# Patient Record
Sex: MALE | Race: WHITE | NOT HISPANIC OR LATINO | Employment: FULL TIME | URBAN - METROPOLITAN AREA
[De-identification: names, ages, dates, MRNs, and addresses within clinical notes are randomized per-mention and may not be internally consistent; named-entity substitution may affect disease eponyms.]

---

## 2017-02-17 ENCOUNTER — OFFICE VISIT (OUTPATIENT)
Dept: SPORTS MEDICINE | Facility: CLINIC | Age: 32
End: 2017-02-17

## 2017-02-17 VITALS
BODY MASS INDEX: 39.48 KG/M2 | DIASTOLIC BLOOD PRESSURE: 92 MMHG | WEIGHT: 282 LBS | HEART RATE: 78 BPM | SYSTOLIC BLOOD PRESSURE: 140 MMHG | RESPIRATION RATE: 16 BRPM | HEIGHT: 71 IN | OXYGEN SATURATION: 98 %

## 2017-02-17 DIAGNOSIS — R03.0 ELEVATED BLOOD PRESSURE (NOT HYPERTENSION): Primary | ICD-10-CM

## 2017-02-17 DIAGNOSIS — E78.2 MIXED HYPERLIPIDEMIA: ICD-10-CM

## 2017-02-17 DIAGNOSIS — J30.1 NON-SEASONAL ALLERGIC RHINITIS DUE TO POLLEN: ICD-10-CM

## 2017-02-17 LAB
CHOLEST SERPL-MCNC: 206 MG/DL (ref 0–200)
HDLC SERPL-MCNC: 35 MG/DL (ref 40–60)
LDLC SERPL CALC-MCNC: 127 MG/DL (ref 0–100)
TRIGL SERPL-MCNC: 218 MG/DL (ref 0–150)
VLDLC SERPL CALC-MCNC: 43.6 MG/DL (ref 5–40)

## 2017-02-17 PROCEDURE — 99214 OFFICE O/P EST MOD 30 MIN: CPT | Performed by: FAMILY MEDICINE

## 2017-02-17 RX ORDER — IPRATROPIUM BROMIDE 21 UG/1
2 SPRAY, METERED NASAL 2 TIMES DAILY PRN
Qty: 30 ML | Refills: 5 | Status: SHIPPED | OUTPATIENT
Start: 2017-02-17 | End: 2017-05-19

## 2017-02-17 NOTE — PROGRESS NOTES
Obinna is a 31 y.o. year old male    Chief Complaint   Patient presents with   • Follow-up   • Hyperlipidemia     follow up cholesterol levels   • Hypertension     states he feels likehis BP has been elevated past couple weeks       History of Present Illness  Elevated BP: He is noticed some symptoms over the past few weeks that he may attribute to his elevated blood pressure.  Namely, he has had a headache in the back of his head as well as some shortness of breath when he is hiking.  Family history of hypertension as well.  His weight has not fluctuated much over the past year.  States that it fluctuates between 265 and 280.  Previous recording at my office was inaccurate.  He also associates some fatigue.    HLD: Persists.  Again, weight has not changed much.  His father passed away unexpectedly in December and he has not been as physically active since then.  Is fasting and would like updated lipid panel today.    AR: Persists.  Did not notice much difference with Flonase at last appointment.  Only thing that seemed to help has been Zyrtec.  Is willing to consider another no spray.    I have reviewed the patient's medical history in detail and updated the computerized patient record.    Review of Systems   Constitutional: Positive for fatigue. Negative for chills and fever.   HENT: Positive for congestion and postnasal drip. Negative for rhinorrhea and sinus pressure.    Respiratory: Negative for chest tightness and shortness of breath.    Cardiovascular: Negative for chest pain.   Gastrointestinal: Negative for abdominal pain.   Musculoskeletal: Negative for arthralgias.   Skin: Negative for rash and wound.   Allergic/Immunologic: Negative for environmental allergies.   Neurological: Positive for headaches. Negative for numbness.   Hematological: Negative for adenopathy.       Visit Vitals   • /92 (BP Location: Left arm, Patient Position: Sitting, Cuff Size: Large Adult)   • Pulse 78   • Resp 16   • Ht  "71\" (180.3 cm)   • Wt 282 lb (128 kg)   • SpO2 98%   • BMI 39.33 kg/m2        Physical Exam   Constitutional: He is oriented to person, place, and time. He appears well-developed and well-nourished.   HENT:   Head: Normocephalic and atraumatic.   Right Ear: External ear normal.   Left Ear: External ear normal.   Nose: Nose normal.   Mouth/Throat: Oropharynx is clear and moist.   Eyes: EOM are normal.   Neck: Normal range of motion.   Cardiovascular: Normal rate and regular rhythm.    Pulmonary/Chest: Effort normal and breath sounds normal.   Neurological: He is alert and oriented to person, place, and time.   Skin: Skin is warm and dry. No rash noted.   Psychiatric: He has a normal mood and affect. His behavior is normal.   Nursing note and vitals reviewed.       Diagnoses and all orders for this visit:    Elevated blood pressure (not hypertension)    Mixed hyperlipidemia  -     Lipid Panel    Non-seasonal allergic rhinitis due to pollen  -     ipratropium (ATROVENT) 0.03 % nasal spray; 2 sprays into each nostril 2 (Two) Times a Day As Needed for rhinitis.      1.  Stable.  Slightly elevated on today's reading, in particular diastolic reading.  Continue to monitor.  Patient will like to do more intentional lifestyle interventions over the next few months.  If symptoms persist, and blood pressure still elevated at next visit, recommend starting medication.  2.  Stable and persistent.  Update lipid panel today.  3.  Persistent.  Continue Zyrtec.  Can try Atrovent nasal spray.  "

## 2017-05-19 ENCOUNTER — OFFICE VISIT (OUTPATIENT)
Dept: SPORTS MEDICINE | Facility: CLINIC | Age: 32
End: 2017-05-19

## 2017-05-19 VITALS
WEIGHT: 279 LBS | RESPIRATION RATE: 18 BRPM | OXYGEN SATURATION: 96 % | DIASTOLIC BLOOD PRESSURE: 92 MMHG | SYSTOLIC BLOOD PRESSURE: 140 MMHG | HEIGHT: 71 IN | HEART RATE: 77 BPM | BODY MASS INDEX: 39.06 KG/M2

## 2017-05-19 DIAGNOSIS — I10 ESSENTIAL HYPERTENSION: Primary | ICD-10-CM

## 2017-05-19 DIAGNOSIS — Z87.828 HISTORY OF TEAR OF MENISCUS OF KNEE JOINT: ICD-10-CM

## 2017-05-19 DIAGNOSIS — M67.441 GANGLION CYST OF FLEXOR TENDON SHEATH OF FINGER OF RIGHT HAND: ICD-10-CM

## 2017-05-19 DIAGNOSIS — Z87.828 HISTORY OF TEAR OF ACL (ANTERIOR CRUCIATE LIGAMENT): ICD-10-CM

## 2017-05-19 DIAGNOSIS — M25.562 ACUTE PAIN OF LEFT KNEE: ICD-10-CM

## 2017-05-19 PROCEDURE — 73562 X-RAY EXAM OF KNEE 3: CPT | Performed by: FAMILY MEDICINE

## 2017-05-19 PROCEDURE — 99214 OFFICE O/P EST MOD 30 MIN: CPT | Performed by: FAMILY MEDICINE

## 2017-05-19 RX ORDER — HYDROCHLOROTHIAZIDE 25 MG/1
25 TABLET ORAL DAILY
Qty: 90 TABLET | Refills: 1 | Status: SHIPPED | OUTPATIENT
Start: 2017-05-19 | End: 2017-08-08 | Stop reason: HOSPADM

## 2017-06-19 ENCOUNTER — OFFICE VISIT (OUTPATIENT)
Dept: SPORTS MEDICINE | Facility: CLINIC | Age: 32
End: 2017-06-19

## 2017-06-19 VITALS
BODY MASS INDEX: 38.3 KG/M2 | RESPIRATION RATE: 16 BRPM | HEART RATE: 90 BPM | WEIGHT: 273.6 LBS | OXYGEN SATURATION: 97 % | HEIGHT: 71 IN | DIASTOLIC BLOOD PRESSURE: 98 MMHG | SYSTOLIC BLOOD PRESSURE: 134 MMHG

## 2017-06-19 DIAGNOSIS — R00.2 PALPITATIONS: ICD-10-CM

## 2017-06-19 DIAGNOSIS — I10 ESSENTIAL HYPERTENSION: Primary | ICD-10-CM

## 2017-06-19 LAB
BUN SERPL-MCNC: 12 MG/DL (ref 6–20)
BUN/CREAT SERPL: 14.8 (ref 7–25)
CALCIUM SERPL-MCNC: 9.3 MG/DL (ref 8.6–10.5)
CHLORIDE SERPL-SCNC: 96 MMOL/L (ref 98–107)
CO2 SERPL-SCNC: 26.1 MMOL/L (ref 22–29)
CREAT SERPL-MCNC: 0.81 MG/DL (ref 0.76–1.27)
GLUCOSE SERPL-MCNC: 73 MG/DL (ref 65–99)
POTASSIUM SERPL-SCNC: 4.4 MMOL/L (ref 3.5–5.2)
SODIUM SERPL-SCNC: 137 MMOL/L (ref 136–145)

## 2017-06-19 PROCEDURE — 99213 OFFICE O/P EST LOW 20 MIN: CPT | Performed by: FAMILY MEDICINE

## 2017-06-19 RX ORDER — NIFEDIPINE 30 MG/1
30 TABLET, EXTENDED RELEASE ORAL DAILY
Qty: 30 TABLET | Refills: 2 | Status: SHIPPED | OUTPATIENT
Start: 2017-06-19 | End: 2017-07-17 | Stop reason: SDUPTHER

## 2017-06-19 NOTE — PROGRESS NOTES
"Obinna is a 31 y.o. year old male    Chief Complaint   Patient presents with   • Follow-up     F/U to recheck BP.        History of Present Illness   HPI Comments: HTN: Has been taking HCTZ as prescribed.  Has noticed an increase in frequency of urination but expected that.  He states that he started checking his blood pressure over the weekend and has noticed that it has been elevated to 150 systolic over 90s diastolic.  Also associates palpitations.  Remembers that his blood pressure was well controlled as well as his symptoms when he was on nifedipine in addition to the HCTZ.  Read online that some of his symptoms could be related to electrolyte abnormalities.       I have reviewed the patient's medical history in detail and updated the computerized patient record.    Review of Systems   Constitutional: Negative for chills, fatigue and fever.   HENT: Negative for congestion, rhinorrhea and sinus pressure.    Respiratory: Negative for chest tightness and shortness of breath.    Cardiovascular: Negative for chest pain.        Per history of present illness     Gastrointestinal: Negative for abdominal pain.   Musculoskeletal: Negative for arthralgias.   Skin: Negative for rash and wound.   Allergic/Immunologic: Negative for environmental allergies.   Neurological: Negative for numbness and headaches.   Hematological: Negative for adenopathy.       /98 (BP Location: Left arm, Patient Position: Sitting, Cuff Size: Adult)  Pulse 90  Resp 16  Ht 71\" (180.3 cm)  Wt 273 lb 9.6 oz (124 kg)  SpO2 97%  BMI 38.16 kg/m2     Physical Exam   Constitutional: He is oriented to person, place, and time. He appears well-developed and well-nourished.   HENT:   Head: Normocephalic and atraumatic.   Right Ear: External ear normal.   Left Ear: External ear normal.   Nose: Nose normal.   Mouth/Throat: Oropharynx is clear and moist.   Eyes: EOM are normal.   Neck: Normal range of motion.   Cardiovascular: Normal rate and " regular rhythm.    Pulmonary/Chest: Effort normal and breath sounds normal.   Neurological: He is alert and oriented to person, place, and time.   Skin: Skin is warm and dry. No rash noted.   Psychiatric: He has a normal mood and affect. His behavior is normal.   Nursing note and vitals reviewed.       Diagnoses and all orders for this visit:    Essential hypertension  -     Basic Metabolic Panel  -     NIFEdipine XL (PROCARDIA XL) 30 MG 24 hr tablet; Take 1 tablet by mouth Daily.    Palpitations      1, 2.  Persistent.  Near goal but I recommend to add back nifedipine 30 mg daily.  Check metabolic panel today as he's been on diuretic for 4 weeks.

## 2017-06-21 ENCOUNTER — TELEPHONE (OUTPATIENT)
Dept: SPORTS MEDICINE | Facility: CLINIC | Age: 32
End: 2017-06-21

## 2017-07-17 ENCOUNTER — OFFICE VISIT (OUTPATIENT)
Dept: SPORTS MEDICINE | Facility: CLINIC | Age: 32
End: 2017-07-17

## 2017-07-17 VITALS
DIASTOLIC BLOOD PRESSURE: 98 MMHG | HEIGHT: 71 IN | HEART RATE: 85 BPM | OXYGEN SATURATION: 98 % | BODY MASS INDEX: 37.94 KG/M2 | SYSTOLIC BLOOD PRESSURE: 132 MMHG | WEIGHT: 271 LBS

## 2017-07-17 DIAGNOSIS — I10 ESSENTIAL HYPERTENSION: Primary | ICD-10-CM

## 2017-07-17 DIAGNOSIS — R00.2 INTERMITTENT PALPITATIONS: ICD-10-CM

## 2017-07-17 DIAGNOSIS — E66.9 OBESITY (BMI 30-39.9): ICD-10-CM

## 2017-07-17 PROCEDURE — 99214 OFFICE O/P EST MOD 30 MIN: CPT | Performed by: FAMILY MEDICINE

## 2017-07-17 RX ORDER — NIFEDIPINE 60 MG/1
60 TABLET, FILM COATED, EXTENDED RELEASE ORAL DAILY
Qty: 90 TABLET | Refills: 1 | Status: SHIPPED | OUTPATIENT
Start: 2017-07-17 | End: 2017-08-22 | Stop reason: HOSPADM

## 2017-07-17 NOTE — PROGRESS NOTES
"Obinna is a 31 y.o. year old male    Chief Complaint   Patient presents with   • Hypertension       History of Present Illness   Hypertension   This is a recurrent problem. The current episode started more than 1 month ago. The problem has been waxing and waning since onset. The problem is resistant. Associated symptoms include anxiety, headaches, malaise/fatigue and palpitations. Pertinent negatives include no blurred vision, chest pain, neck pain, orthopnea, peripheral edema, PND, shortness of breath or sweats. Agents associated with hypertension include NSAIDs. Risk factors for coronary artery disease include dyslipidemia, obesity, sedentary lifestyle and stress. Compliance problems include diet, exercise and medication side effects.       On occasion, notices that his heart rate even at rest is in the 90s and 100s.  Associates some palpitations.  Thinks that the symptoms began with onset of taking HCTZ.  Admits that he is not as active as he would like to be and is having difficulties finding time to do so as he is caring for his father-in-law who has aortic aneurysms.    I have reviewed the patient's medical history in detail and updated the computerized patient record.    Review of Systems   Constitutional: Positive for malaise/fatigue.   Eyes: Negative for blurred vision.   Respiratory: Negative for shortness of breath.    Cardiovascular: Positive for palpitations. Negative for chest pain, orthopnea and PND.   Musculoskeletal: Negative for neck pain.   Neurological: Positive for headaches.   All other systems reviewed and are negative.      /98  Pulse 85  Ht 71\" (180.3 cm)  Wt 271 lb (123 kg)  SpO2 98%  BMI 37.8 kg/m2     Physical Exam   Constitutional: He is oriented to person, place, and time. He appears well-developed and well-nourished.   HENT:   Head: Normocephalic and atraumatic.   Right Ear: External ear normal.   Left Ear: External ear normal.   Nose: Nose normal.   Mouth/Throat: " Oropharynx is clear and moist.   Eyes: EOM are normal.   Neck: Normal range of motion.   Cardiovascular: Normal rate and regular rhythm.    Pulmonary/Chest: Effort normal and breath sounds normal.   Neurological: He is alert and oriented to person, place, and time.   Skin: Skin is warm and dry. No rash noted.   Psychiatric: He has a normal mood and affect. His behavior is normal.   Nursing note and vitals reviewed.       Diagnoses and all orders for this visit:    Essential hypertension  -     NIFEdipine XL (ADALAT CC) 60 MG 24 hr tablet; Take 1 tablet by mouth Daily.    Intermittent palpitations    Obesity (BMI 30-39.9)      1.  Persistent, diastolic not at goal.  No history of liver disease.  Increase nifedipine to 60 mg daily.  He is to call back in a few days with home readings.  If still elevated in still having elevated heart rate, consider switching HCTZ.  He has been on losartan in the past as well.  2.  Discussed differential.  Unsure if HCTZ could be causing his symptoms.  3.  Continue to recommend lifestyle changes and establishing good exercise habits.

## 2017-07-31 ENCOUNTER — CLINICAL SUPPORT (OUTPATIENT)
Dept: SPORTS MEDICINE | Facility: CLINIC | Age: 32
End: 2017-07-31

## 2017-07-31 VITALS — SYSTOLIC BLOOD PRESSURE: 138 MMHG | DIASTOLIC BLOOD PRESSURE: 82 MMHG

## 2017-08-08 DIAGNOSIS — I10 ESSENTIAL HYPERTENSION: Primary | ICD-10-CM

## 2017-08-08 RX ORDER — METOPROLOL SUCCINATE 25 MG/1
25 TABLET, EXTENDED RELEASE ORAL DAILY
Qty: 90 TABLET | Refills: 0 | Status: SHIPPED | OUTPATIENT
Start: 2017-08-08 | End: 2017-08-22 | Stop reason: SDUPTHER

## 2017-08-22 ENCOUNTER — OFFICE VISIT (OUTPATIENT)
Dept: SPORTS MEDICINE | Facility: CLINIC | Age: 32
End: 2017-08-22

## 2017-08-22 VITALS
HEART RATE: 91 BPM | WEIGHT: 278.2 LBS | OXYGEN SATURATION: 96 % | DIASTOLIC BLOOD PRESSURE: 82 MMHG | HEIGHT: 72 IN | BODY MASS INDEX: 37.68 KG/M2 | SYSTOLIC BLOOD PRESSURE: 132 MMHG | RESPIRATION RATE: 16 BRPM

## 2017-08-22 DIAGNOSIS — R00.2 INTERMITTENT PALPITATIONS: ICD-10-CM

## 2017-08-22 DIAGNOSIS — E66.9 OBESITY (BMI 30-39.9): ICD-10-CM

## 2017-08-22 DIAGNOSIS — I10 ESSENTIAL HYPERTENSION: Primary | ICD-10-CM

## 2017-08-22 PROCEDURE — 99214 OFFICE O/P EST MOD 30 MIN: CPT | Performed by: FAMILY MEDICINE

## 2017-08-22 RX ORDER — CHLORTHALIDONE 25 MG/1
25 TABLET ORAL DAILY
Qty: 90 TABLET | Refills: 1 | Status: SHIPPED | OUTPATIENT
Start: 2017-08-22 | End: 2017-10-17

## 2017-08-22 RX ORDER — METOPROLOL SUCCINATE 50 MG/1
50 TABLET, EXTENDED RELEASE ORAL DAILY
Qty: 90 TABLET | Refills: 1 | Status: SHIPPED | OUTPATIENT
Start: 2017-08-22 | End: 2018-03-19 | Stop reason: SDUPTHER

## 2017-08-22 NOTE — PROGRESS NOTES
"Obinna is a 31 y.o. year old male    Chief Complaint   Patient presents with   • Follow-up     F/U for BP and to discuss BP medication.        History of Present Illness   HPI Comments: HTN: Has been taking metoprolol and nifedipine as written.  Since stopping HCTZ, has noticed swelling but has not noticed anything in change in his heart rate.  Blood pressure has been the same.  He has switched to taking medications at night.  Associates some hot flashes with medication switch.       I have reviewed the patient's medical, family, and social history in detail and updated the computerized patient record.    Review of Systems   Constitutional: Negative for chills, fatigue and fever.   HENT: Negative for congestion, rhinorrhea and sinus pressure.    Respiratory: Negative for chest tightness and shortness of breath.    Cardiovascular: Negative for chest pain.   Gastrointestinal: Negative for abdominal pain.   Endocrine:        Hot flashes   Musculoskeletal: Negative for arthralgias.   Skin: Negative for rash.   Neurological: Negative for numbness and headaches.   Psychiatric/Behavioral: Positive for sleep disturbance.   All other systems reviewed and are negative.      /82 (BP Location: Left arm, Patient Position: Sitting, Cuff Size: Adult)  Pulse 91  Resp 16  Ht 72\" (182.9 cm)  Wt 278 lb 3.2 oz (126 kg)  SpO2 96%  BMI 37.73 kg/m2     Physical Exam   Constitutional: He is oriented to person, place, and time. He appears well-developed and well-nourished.   HENT:   Head: Normocephalic and atraumatic.   Right Ear: External ear normal.   Left Ear: External ear normal.   Nose: Nose normal.   Mouth/Throat: Oropharynx is clear and moist.   Eyes: EOM are normal.   Neck: Normal range of motion.   Cardiovascular: Normal rate and regular rhythm.    Pulmonary/Chest: Effort normal and breath sounds normal.   Neurological: He is alert and oriented to person, place, and time.   Skin: Skin is warm and dry. No rash noted. "   Psychiatric: He has a normal mood and affect. His behavior is normal.   Nursing note and vitals reviewed.       Diagnoses and all orders for this visit:    Essential hypertension  -     metoprolol succinate XL (TOPROL-XL) 50 MG 24 hr tablet; Take 1 tablet by mouth Daily.  -     chlorthalidone (HYGROTON) 25 MG tablet; Take 1 tablet by mouth Daily.    Intermittent palpitations    Obesity (BMI 30-39.9)      Stop nifedipine.  Increase metoprolol to 50 mg daily.  Add chlorthalidone 25 mg daily.  He'll come back for his physical in the next 4 weeks and we will check his metabolic panel with this as well.

## 2017-09-12 ENCOUNTER — LAB (OUTPATIENT)
Dept: SPORTS MEDICINE | Facility: CLINIC | Age: 32
End: 2017-09-12

## 2017-09-12 DIAGNOSIS — Z00.00 PREVENTATIVE HEALTH CARE: ICD-10-CM

## 2017-09-12 DIAGNOSIS — Z00.00 PREVENTATIVE HEALTH CARE: Primary | ICD-10-CM

## 2017-09-12 LAB
ALBUMIN SERPL-MCNC: 4.9 G/DL (ref 3.5–5.2)
ALBUMIN/GLOB SERPL: 1.6 G/DL
ALP SERPL-CCNC: 62 U/L (ref 39–117)
ALT SERPL-CCNC: 42 U/L (ref 1–41)
APPEARANCE UR: CLEAR
AST SERPL-CCNC: 28 U/L (ref 1–40)
BILIRUB SERPL-MCNC: 0.5 MG/DL (ref 0.1–1.2)
BILIRUB UR QL STRIP: NEGATIVE
BUN SERPL-MCNC: 20 MG/DL (ref 6–20)
BUN/CREAT SERPL: 21.1 (ref 7–25)
CALCIUM SERPL-MCNC: 9.7 MG/DL (ref 8.6–10.5)
CHLORIDE SERPL-SCNC: 93 MMOL/L (ref 98–107)
CHOLEST SERPL-MCNC: 194 MG/DL (ref 0–200)
CHOLEST/HDLC SERPL: 6.06 {RATIO}
CO2 SERPL-SCNC: 30.9 MMOL/L (ref 22–29)
COLOR UR: YELLOW
CREAT SERPL-MCNC: 0.95 MG/DL (ref 0.76–1.27)
GLOBULIN SER CALC-MCNC: 3.1 GM/DL
GLUCOSE SERPL-MCNC: 91 MG/DL (ref 65–99)
GLUCOSE UR QL: NEGATIVE
HDLC SERPL-MCNC: 32 MG/DL (ref 40–60)
HGB UR QL STRIP: NEGATIVE
KETONES UR QL STRIP: NEGATIVE
LDLC SERPL CALC-MCNC: 124 MG/DL (ref 0–100)
LEUKOCYTE ESTERASE UR QL STRIP: NEGATIVE
NITRITE UR QL STRIP: NEGATIVE
PH UR STRIP: 6.5 [PH] (ref 5–8)
POTASSIUM SERPL-SCNC: 3.7 MMOL/L (ref 3.5–5.2)
PROT SERPL-MCNC: 8 G/DL (ref 6–8.5)
PROT UR QL STRIP: ABNORMAL
SODIUM SERPL-SCNC: 140 MMOL/L (ref 136–145)
SP GR UR: ABNORMAL (ref 1–1.03)
TRIGL SERPL-MCNC: 191 MG/DL (ref 0–150)
UROBILINOGEN UR STRIP-MCNC: ABNORMAL MG/DL
VLDLC SERPL CALC-MCNC: 38.2 MG/DL (ref 5–40)

## 2017-09-19 ENCOUNTER — OFFICE VISIT (OUTPATIENT)
Dept: SPORTS MEDICINE | Facility: CLINIC | Age: 32
End: 2017-09-19

## 2017-09-19 VITALS
OXYGEN SATURATION: 98 % | WEIGHT: 276 LBS | HEIGHT: 72 IN | RESPIRATION RATE: 16 BRPM | HEART RATE: 85 BPM | SYSTOLIC BLOOD PRESSURE: 124 MMHG | DIASTOLIC BLOOD PRESSURE: 70 MMHG | BODY MASS INDEX: 37.38 KG/M2

## 2017-09-19 DIAGNOSIS — E78.2 MIXED HYPERLIPIDEMIA: ICD-10-CM

## 2017-09-19 DIAGNOSIS — Z00.00 ANNUAL PHYSICAL EXAM: Primary | ICD-10-CM

## 2017-09-19 DIAGNOSIS — I10 ESSENTIAL HYPERTENSION: ICD-10-CM

## 2017-09-19 PROCEDURE — 99395 PREV VISIT EST AGE 18-39: CPT | Performed by: FAMILY MEDICINE

## 2017-09-19 NOTE — PROGRESS NOTES
"Obinna White is here today for an annual physical exam.     Eating a healthy diet. Exercising routinely.     Went to [facility] ED 9/4/17 for panic attack. UDS + for cannabis - states he ate brownies from [place]. I have reviewed that encounter, incl EKG, echo report. Has not had episode since.    HTN: doing well on new Rx. Pleased with where BP, pulse have been.    I have reviewed the patient's medical, family, and social history in detail and updated the computerized patient record.    Screening history:  Metabolic - last year    Health Maintenance   Topic Date Due   • INFLUENZA VACCINE  08/01/2017   • LIPID PANEL  09/12/2018   • TDAP/TD VACCINES (2 - Td) 01/01/2021       Review of Systems   Constitutional: Negative for chills, fatigue and fever.   HENT: Negative for postnasal drip and sore throat.    Eyes: Negative for pain.   Respiratory: Negative for cough, chest tightness and wheezing.    Cardiovascular: Negative for chest pain.   Gastrointestinal: Negative.    Musculoskeletal: Negative for myalgias.   Skin: Negative for rash.   Neurological: Negative for numbness and headaches.   Psychiatric/Behavioral: Negative.    All other systems reviewed and are negative.      /70 (BP Location: Left arm, Patient Position: Sitting, Cuff Size: Large Adult)  Pulse 85  Resp 16  Ht 72\" (182.9 cm)  Wt 276 lb (125 kg)  SpO2 98%  BMI 37.43 kg/m2     Physical Exam    Vital signs reviewed.  General appearance: No acute distress  Eyes: conjunctiva clear without erythema; pupils equally round and reactive  ENT: external ears and nose normal; hearing normal, oropharynx clear  Neck: supple; no thyromegaly  CV: normal rate and rhythm; no peripheral edema  Respiratory: normal respiratory effort; lungs clear to auscultation bilaterally  MSK: normal gait and station; no focal joint deformity or swelling  Skin: no rash or wounds; normal turgor  Neuro: cranial nerves 2-12 grossly intact; normal sensation to light " touch  Psych: mood and affect normal; recent and remote memory intact    Obinna was seen today for annual exam, hypertension and follow-up.    Diagnoses and all orders for this visit:    Annual physical exam    Essential hypertension    Mixed hyperlipidemia      Reviewed blood work today. Minimal improvement in cholesterol panel. Continue lifestyle changes.    In regards to panic attack, unknown trigger. Did  him on whether the food he ate with cannabis had something to do with this.    Encourage healthy diet and exercise.  Encourage patient to stay up to date on screening examinations as indicated based on age and risk factors.    EMR Dragon/Transcription disclaimer:    Much of this encounter note is an electronic transcription/translation of spoken language to printed text.  The electronic translation of spoken language may permit erroneous, or at times, nonsensical words or phrases to be inadvertently transcribed.  Although I have reviewed the note for such errors some may still exist.

## 2017-09-28 DIAGNOSIS — I10 ESSENTIAL HYPERTENSION: ICD-10-CM

## 2017-09-29 RX ORDER — METOPROLOL SUCCINATE 50 MG/1
TABLET, EXTENDED RELEASE ORAL
Qty: 90 TABLET | Refills: 0 | OUTPATIENT
Start: 2017-09-29

## 2017-10-17 ENCOUNTER — TELEPHONE (OUTPATIENT)
Dept: SPORTS MEDICINE | Facility: CLINIC | Age: 32
End: 2017-10-17

## 2017-10-17 DIAGNOSIS — I10 ESSENTIAL HYPERTENSION: ICD-10-CM

## 2017-10-17 RX ORDER — CHLORTHALIDONE 25 MG/1
25 TABLET ORAL DAILY
Qty: 90 TABLET | Refills: 0
Start: 2017-10-17 | End: 2018-03-19 | Stop reason: SDUPTHER

## 2017-10-17 RX ORDER — NIFEDIPINE 60 MG/1
TABLET, FILM COATED, EXTENDED RELEASE ORAL
Qty: 90 TABLET | Refills: 0 | Status: SHIPPED | OUTPATIENT
Start: 2017-10-17 | End: 2017-10-17

## 2017-10-17 NOTE — TELEPHONE ENCOUNTER
----- Message from Obinna White sent at 10/17/2017  2:44 PM EDT -----  Regarding: Prescription Question  Contact: [phone]  I just got messages in [facility] saying that you declined my metoprolol and approved nifedipine. Am I switching medications?

## 2017-10-17 NOTE — TELEPHONE ENCOUNTER
Mistake was made pharmacy had med on auto refill. Spoke with patient and advised to cont. Current meds Dr. Heard has prescribed

## 2018-02-10 DIAGNOSIS — I10 ESSENTIAL HYPERTENSION: ICD-10-CM

## 2018-02-12 RX ORDER — CHLORTHALIDONE 25 MG/1
TABLET ORAL
Qty: 90 TABLET | Refills: 0 | Status: SHIPPED | OUTPATIENT
Start: 2018-02-12 | End: 2018-03-19 | Stop reason: SDUPTHER

## 2018-03-19 ENCOUNTER — OFFICE VISIT (OUTPATIENT)
Dept: SPORTS MEDICINE | Facility: CLINIC | Age: 33
End: 2018-03-19

## 2018-03-19 VITALS
BODY MASS INDEX: 38.19 KG/M2 | HEART RATE: 84 BPM | HEIGHT: 72 IN | WEIGHT: 282 LBS | SYSTOLIC BLOOD PRESSURE: 136 MMHG | DIASTOLIC BLOOD PRESSURE: 84 MMHG | OXYGEN SATURATION: 99 %

## 2018-03-19 DIAGNOSIS — E66.9 OBESITY (BMI 30-39.9): ICD-10-CM

## 2018-03-19 DIAGNOSIS — I10 ESSENTIAL HYPERTENSION: Primary | ICD-10-CM

## 2018-03-19 PROCEDURE — 99214 OFFICE O/P EST MOD 30 MIN: CPT | Performed by: FAMILY MEDICINE

## 2018-03-19 RX ORDER — CHLORTHALIDONE 25 MG/1
25 TABLET ORAL DAILY
Qty: 90 TABLET | Refills: 3
Start: 2018-03-19 | End: 2018-05-03 | Stop reason: SDUPTHER

## 2018-03-19 RX ORDER — METOPROLOL SUCCINATE 50 MG/1
50 TABLET, EXTENDED RELEASE ORAL DAILY
Qty: 90 TABLET | Refills: 3 | Status: SHIPPED | OUTPATIENT
Start: 2018-03-19 | End: 2018-12-20 | Stop reason: SDUPTHER

## 2018-03-19 NOTE — PROGRESS NOTES
"Obinna is a 32 y.o. year old male    Chief Complaint   Patient presents with   • Follow-up       History of Present Illness   HPI     HTN: Tolerating medication well with no adverse effects.  Has had similar readings away from office.  Pulse has been well controlled.  Requests refill on medication.  No significant changes in terms of lifestyle to discuss.    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  He is not participating in high adventure activities.    I have reviewed the patient's medical, family, and social history in detail and updated the computerized patient record.    Review of Systems   Constitutional: Negative for chills, fatigue and fever.   HENT: Negative for postnasal drip and sore throat.    Eyes: Negative for pain.   Respiratory: Negative for cough, chest tightness and wheezing.    Cardiovascular: Negative for chest pain.   Gastrointestinal: Negative.    Musculoskeletal: Negative for myalgias.   Skin: Negative for rash.   Neurological: Negative for numbness and headaches.   Psychiatric/Behavioral: Negative.    All other systems reviewed and are negative.      /84   Pulse 84   Ht 182.9 cm (72\")   Wt 128 kg (282 lb)   SpO2 99%   BMI 38.25 kg/m²      Physical Exam   Constitutional: He is oriented to person, place, and time. He appears well-developed and well-nourished.   HENT:   Head: Normocephalic and atraumatic.   Right Ear: External ear normal.   Left Ear: External ear normal.   Nose: Nose normal.   Mouth/Throat: Oropharynx is clear and moist.   Eyes: EOM are normal.   Neck: Normal range of motion.   Cardiovascular: Normal rate and regular rhythm.    Pulmonary/Chest: Effort normal and breath sounds normal.   Neurological: He is alert and oriented to person, place, and time.   Skin: Skin is warm and dry. No rash noted.   Psychiatric: He has a normal mood and affect. His behavior is normal.   Nursing note and vitals reviewed.     "   Diagnoses and all orders for this visit:    Essential hypertension  -     chlorthalidone (HYGROTON) 25 MG tablet; Take 1 tablet by mouth Daily.  -     metoprolol succinate XL (TOPROL-XL) 50 MG 24 hr tablet; Take 1 tablet by mouth Daily.    Obesity (BMI 30-39.9)       Stable and at goal. Refill Rx.    Prior to next appt, CPE labs: CBC, CMP, FLP, UA.      EMR Dragon/Transcription disclaimer:    Much of this encounter note is an electronic transcription/translation of spoken language to printed text.  The electronic translation of spoken language may permit erroneous, or at times, nonsensical words or phrases to be inadvertently transcribed.  Although I have reviewed the note for such errors some may still exist.

## 2018-05-03 DIAGNOSIS — I10 ESSENTIAL HYPERTENSION: ICD-10-CM

## 2018-05-03 RX ORDER — CHLORTHALIDONE 25 MG/1
25 TABLET ORAL DAILY
Qty: 90 TABLET | Refills: 3
Start: 2018-05-03 | End: 2018-08-02 | Stop reason: SDUPTHER

## 2018-08-02 DIAGNOSIS — I10 ESSENTIAL HYPERTENSION: ICD-10-CM

## 2018-08-06 RX ORDER — CHLORTHALIDONE 25 MG/1
TABLET ORAL
Qty: 90 TABLET | Refills: 0 | Status: SHIPPED | OUTPATIENT
Start: 2018-08-06 | End: 2018-11-06 | Stop reason: SDUPTHER

## 2018-11-06 DIAGNOSIS — I10 ESSENTIAL HYPERTENSION: ICD-10-CM

## 2018-11-07 RX ORDER — CHLORTHALIDONE 25 MG/1
TABLET ORAL
Qty: 90 TABLET | Refills: 0 | Status: SHIPPED | OUTPATIENT
Start: 2018-11-07 | End: 2018-12-20 | Stop reason: SDUPTHER

## 2018-12-20 DIAGNOSIS — I10 ESSENTIAL HYPERTENSION: ICD-10-CM

## 2018-12-20 RX ORDER — CHLORTHALIDONE 25 MG/1
25 TABLET ORAL DAILY
Qty: 90 TABLET | Refills: 0 | Status: SHIPPED | OUTPATIENT
Start: 2018-12-20 | End: 2019-03-11 | Stop reason: SDUPTHER

## 2018-12-20 RX ORDER — METOPROLOL SUCCINATE 50 MG/1
50 TABLET, EXTENDED RELEASE ORAL DAILY
Qty: 90 TABLET | Refills: 3 | Status: SHIPPED | OUTPATIENT
Start: 2018-12-20 | End: 2019-11-04 | Stop reason: SDUPTHER

## 2019-03-11 DIAGNOSIS — Z13.220 SCREENING CHOLESTEROL LEVEL: ICD-10-CM

## 2019-03-11 DIAGNOSIS — I10 ESSENTIAL HYPERTENSION: ICD-10-CM

## 2019-03-11 DIAGNOSIS — E78.2 MIXED HYPERLIPIDEMIA: ICD-10-CM

## 2019-03-11 DIAGNOSIS — Z00.00 ANNUAL PHYSICAL EXAM: Primary | ICD-10-CM

## 2019-03-11 DIAGNOSIS — Z13.220 LIPID SCREENING: ICD-10-CM

## 2019-03-11 RX ORDER — CHLORTHALIDONE 25 MG/1
TABLET ORAL
Qty: 90 TABLET | Refills: 0 | Status: SHIPPED | OUTPATIENT
Start: 2019-03-11 | End: 2019-03-19 | Stop reason: SDUPTHER

## 2019-03-13 LAB
ALBUMIN SERPL-MCNC: 5 G/DL (ref 3.5–5.2)
ALBUMIN/GLOB SERPL: 1.6 G/DL
ALP SERPL-CCNC: 56 U/L (ref 39–117)
ALT SERPL-CCNC: 54 U/L (ref 1–41)
APPEARANCE UR: CLEAR
AST SERPL-CCNC: 30 U/L (ref 1–40)
BACTERIA #/AREA URNS HPF: ABNORMAL /HPF
BASOPHILS # BLD AUTO: 0.1 10*3/MM3 (ref 0–0.2)
BASOPHILS NFR BLD AUTO: 1 % (ref 0–1.5)
BILIRUB SERPL-MCNC: 0.4 MG/DL (ref 0.1–1.2)
BILIRUB UR QL STRIP: NEGATIVE
BUN SERPL-MCNC: 13 MG/DL (ref 6–20)
BUN/CREAT SERPL: 17.1 (ref 7–25)
CALCIUM SERPL-MCNC: 10.3 MG/DL (ref 8.6–10.5)
CASTS URNS MICRO: ABNORMAL
CASTS URNS QL MICRO: PRESENT /LPF
CHLORIDE SERPL-SCNC: 94 MMOL/L (ref 98–107)
CHOLEST SERPL-MCNC: 202 MG/DL (ref 0–200)
CO2 SERPL-SCNC: 31.6 MMOL/L (ref 22–29)
COLOR UR: YELLOW
CREAT SERPL-MCNC: 0.76 MG/DL (ref 0.76–1.27)
EOSINOPHIL # BLD AUTO: 0.42 10*3/MM3 (ref 0–0.4)
EOSINOPHIL NFR BLD AUTO: 4.2 % (ref 0.3–6.2)
EPI CELLS #/AREA URNS HPF: ABNORMAL /HPF
ERYTHROCYTE [DISTWIDTH] IN BLOOD BY AUTOMATED COUNT: 13.1 % (ref 12.3–15.4)
GLOBULIN SER CALC-MCNC: 3.2 GM/DL
GLUCOSE SERPL-MCNC: 97 MG/DL (ref 65–99)
GLUCOSE UR QL: NEGATIVE
HCT VFR BLD AUTO: 47.6 % (ref 37.5–51)
HDLC SERPL-MCNC: 35 MG/DL (ref 40–60)
HGB BLD-MCNC: 15.4 G/DL (ref 13–17.7)
HGB UR QL STRIP: NEGATIVE
IMM GRANULOCYTES # BLD AUTO: 0.02 10*3/MM3 (ref 0–0.05)
IMM GRANULOCYTES NFR BLD AUTO: 0.2 % (ref 0–0.5)
KETONES UR QL STRIP: NEGATIVE
LDLC SERPL CALC-MCNC: 94 MG/DL (ref 0–100)
LDLC/HDLC SERPL: 2.68 {RATIO}
LEUKOCYTE ESTERASE UR QL STRIP: NEGATIVE
LYMPHOCYTES # BLD AUTO: 3.25 10*3/MM3 (ref 0.7–3.1)
LYMPHOCYTES NFR BLD AUTO: 32.2 % (ref 19.6–45.3)
MCH RBC QN AUTO: 30.4 PG (ref 26.6–33)
MCHC RBC AUTO-ENTMCNC: 32.4 G/DL (ref 31.5–35.7)
MCV RBC AUTO: 93.9 FL (ref 79–97)
MICRO URNS: NORMAL
MICRO URNS: NORMAL
MONOCYTES # BLD AUTO: 0.93 10*3/MM3 (ref 0.1–0.9)
MONOCYTES NFR BLD AUTO: 9.2 % (ref 5–12)
MUCOUS THREADS URNS QL MICRO: PRESENT /HPF
NEUTROPHILS # BLD AUTO: 5.37 10*3/MM3 (ref 1.4–7)
NEUTROPHILS NFR BLD AUTO: 53.2 % (ref 42.7–76)
NITRITE UR QL STRIP: NEGATIVE
NRBC BLD AUTO-RTO: 0 /100 WBC (ref 0–0)
PH UR STRIP: 5.5 [PH] (ref 5–7.5)
PLATELET # BLD AUTO: 272 10*3/MM3 (ref 140–450)
POTASSIUM SERPL-SCNC: 4 MMOL/L (ref 3.5–5.2)
PROT SERPL-MCNC: 8.2 G/DL (ref 6–8.5)
PROT UR QL STRIP: NEGATIVE
RBC # BLD AUTO: 5.07 10*6/MM3 (ref 4.14–5.8)
RBC #/AREA URNS HPF: ABNORMAL /HPF
SODIUM SERPL-SCNC: 138 MMOL/L (ref 136–145)
SP GR UR: 1.02 (ref 1–1.03)
TRIGL SERPL-MCNC: 366 MG/DL (ref 0–150)
URINALYSIS REFLEX: NORMAL
UROBILINOGEN UR STRIP-MCNC: 0.2 MG/DL (ref 0.2–1)
VLDLC SERPL CALC-MCNC: 73.2 MG/DL (ref 5–40)
WBC # BLD AUTO: 10.09 10*3/MM3 (ref 3.4–10.8)
WBC #/AREA URNS HPF: ABNORMAL /HPF

## 2019-03-19 ENCOUNTER — OFFICE VISIT (OUTPATIENT)
Dept: SPORTS MEDICINE | Facility: CLINIC | Age: 34
End: 2019-03-19

## 2019-03-19 VITALS
DIASTOLIC BLOOD PRESSURE: 84 MMHG | WEIGHT: 291 LBS | OXYGEN SATURATION: 99 % | SYSTOLIC BLOOD PRESSURE: 122 MMHG | HEIGHT: 72 IN | HEART RATE: 70 BPM | BODY MASS INDEX: 39.42 KG/M2

## 2019-03-19 DIAGNOSIS — I10 ESSENTIAL HYPERTENSION: ICD-10-CM

## 2019-03-19 DIAGNOSIS — E78.2 MIXED HYPERLIPIDEMIA: ICD-10-CM

## 2019-03-19 DIAGNOSIS — Z00.00 ANNUAL PHYSICAL EXAM: Primary | ICD-10-CM

## 2019-03-19 PROCEDURE — 99395 PREV VISIT EST AGE 18-39: CPT | Performed by: FAMILY MEDICINE

## 2019-03-19 RX ORDER — CHLORTHALIDONE 25 MG/1
25 TABLET ORAL DAILY
COMMUNITY
End: 2019-05-21 | Stop reason: SDUPTHER

## 2019-03-19 NOTE — PROGRESS NOTES
"Obinna White is here today for an annual physical exam.     Eating a healthy diet. Exercising routinely.     Intermittent nosebleeds - may be seasonal. Can get it to stop quickly.    Insurance paperwork today. BSA physical paperwork today.    I have reviewed the patient's medical, family, and social history in detail and updated the computerized patient record.    Health Maintenance   Topic Date Due   • INFLUENZA VACCINE  08/01/2018   • ANNUAL PHYSICAL  09/20/2018   • LIPID PANEL  03/12/2020   • TDAP/TD VACCINES (2 - Td) 01/01/2021       Review of Systems  Constitutional: Negative for chills, fatigue and fever.   HENT: Negative for postnasal drip and sore throat.    Eyes: Negative for pain.   Respiratory: Negative for cough, chest tightness and wheezing.    Cardiovascular: Negative for chest pain.   Gastrointestinal: Negative.    Musculoskeletal: Negative for myalgias.   Skin: Negative for rash.   Neurological: Negative for numbness and headaches.   Psychiatric/Behavioral: Negative.    All other systems reviewed and are negative.    /84   Pulse 70   Ht 182.9 cm (72\")   Wt 132 kg (291 lb)   SpO2 99%   BMI 39.47 kg/m²      Physical Exam    Vital signs reviewed.  General appearance: No acute distress  Eyes: conjunctiva clear without erythema; pupils equally round and reactive  ENT: external ears and nose normal; hearing normal, oropharynx clear  Neck: supple; no thyromegaly  CV: normal rate and rhythm; no peripheral edema  Respiratory: normal respiratory effort; lungs clear to auscultation bilaterally  MSK: normal gait and station; no focal joint deformity or swelling  Skin: no rash or wounds; normal turgor  Neuro: cranial nerves 2-12 grossly intact; normal sensation to light touch  Psych: mood and affect normal; recent and remote memory intact    PHQ-2 Depression Screening  Little interest or pleasure in doing things? 0   Feeling down, depressed, or hopeless? 0   PHQ-2 Total Score 0       Orders Only on " 03/11/2019   Component Date Value Ref Range Status   • Glucose 03/12/2019 97  65 - 99 mg/dL Final   • BUN 03/12/2019 13  6 - 20 mg/dL Final   • Creatinine 03/12/2019 0.76  0.76 - 1.27 mg/dL Final   • eGFR Non African Am 03/12/2019 117  >60 mL/min/1.73 Final   • eGFR African Am 03/12/2019 141  >60 mL/min/1.73 Final   • BUN/Creatinine Ratio 03/12/2019 17.1  7.0 - 25.0 Final   • Sodium 03/12/2019 138  136 - 145 mmol/L Final   • Potassium 03/12/2019 4.0  3.5 - 5.2 mmol/L Final   • Chloride 03/12/2019 94* 98 - 107 mmol/L Final   • Total CO2 03/12/2019 31.6* 22.0 - 29.0 mmol/L Final   • Calcium 03/12/2019 10.3  8.6 - 10.5 mg/dL Final   • Total Protein 03/12/2019 8.2  6.0 - 8.5 g/dL Final   • Albumin 03/12/2019 5.00  3.50 - 5.20 g/dL Final   • Globulin 03/12/2019 3.2  gm/dL Final   • A/G Ratio 03/12/2019 1.6  g/dL Final   • Total Bilirubin 03/12/2019 0.4  0.1 - 1.2 mg/dL Final   • Alkaline Phosphatase 03/12/2019 56  39 - 117 U/L Final   • AST (SGOT) 03/12/2019 30  1 - 40 U/L Final   • ALT (SGPT) 03/12/2019 54* 1 - 41 U/L Final   • Total Cholesterol 03/12/2019 202* 0 - 200 mg/dL Final   • Triglycerides 03/12/2019 366* 0 - 150 mg/dL Final   • HDL Cholesterol 03/12/2019 35* 40 - 60 mg/dL Final   • VLDL Cholesterol 03/12/2019 73.2* 5 - 40 mg/dL Final   • LDL Cholesterol  03/12/2019 94  0 - 100 mg/dL Final   • LDL/HDL Ratio 03/12/2019 2.68   Final   • Specific Gravity, UA 03/12/2019 1.024  1.005 - 1.030 Final   • pH, UA 03/12/2019 5.5  5.0 - 7.5 Final   • Color, UA 03/12/2019 Yellow  Yellow Final   • Appearance, UA 03/12/2019 Clear  Clear Final   • Leukocytes, UA 03/12/2019 Negative  Negative Final   • Protein 03/12/2019 Negative  Negative/Trace Final   • Glucose, UA 03/12/2019 Negative  Negative Final   • Ketones 03/12/2019 Negative  Negative Final   • Blood, UA 03/12/2019 Negative  Negative Final   • Bilirubin, UA 03/12/2019 Negative  Negative Final   • Urobilinogen, UA 03/12/2019 0.2  0.2 - 1.0 mg/dL Final   • Nitrite, UA  03/12/2019 Negative  Negative Final   • Microscopic Examination 03/12/2019 Comment   Final    Microscopic follows if indicated.   • Microscopic Examination 03/12/2019 See below:   Final    Microscopic was indicated and was performed.   • Urinalysis Reflex 03/12/2019 Comment   Final    This specimen will not reflex to a Urine Culture.   • WBC 03/12/2019 10.09  3.40 - 10.80 10*3/mm3 Final   • RBC 03/12/2019 5.07  4.14 - 5.80 10*6/mm3 Final   • Hemoglobin 03/12/2019 15.4  13.0 - 17.7 g/dL Final   • Hematocrit 03/12/2019 47.6  37.5 - 51.0 % Final   • MCV 03/12/2019 93.9  79.0 - 97.0 fL Final   • MCH 03/12/2019 30.4  26.6 - 33.0 pg Final   • MCHC 03/12/2019 32.4  31.5 - 35.7 g/dL Final   • RDW 03/12/2019 13.1  12.3 - 15.4 % Final   • Platelets 03/12/2019 272  140 - 450 10*3/mm3 Final   • Neutrophil Rel % 03/12/2019 53.2  42.7 - 76.0 % Final   • Lymphocyte Rel % 03/12/2019 32.2  19.6 - 45.3 % Final   • Monocyte Rel % 03/12/2019 9.2  5.0 - 12.0 % Final   • Eosinophil Rel % 03/12/2019 4.2  0.3 - 6.2 % Final   • Basophil Rel % 03/12/2019 1.0  0.0 - 1.5 % Final   • Neutrophils Absolute 03/12/2019 5.37  1.40 - 7.00 10*3/mm3 Final   • Lymphocytes Absolute 03/12/2019 3.25* 0.70 - 3.10 10*3/mm3 Final   • Monocytes Absolute 03/12/2019 0.93* 0.10 - 0.90 10*3/mm3 Final   • Eosinophils Absolute 03/12/2019 0.42* 0.00 - 0.40 10*3/mm3 Final   • Basophils Absolute 03/12/2019 0.10  0.00 - 0.20 10*3/mm3 Final   • Immature Granulocyte Rel % 03/12/2019 0.2  0.0 - 0.5 % Final   • Immature Grans Absolute 03/12/2019 0.02  0.00 - 0.05 10*3/mm3 Final   • nRBC 03/12/2019 0.0  0.0 - 0.0 /100 WBC Final   • WBC, UA 03/12/2019 0-5  0 - 5 /hpf Final   • RBC, UA 03/12/2019 0-2  0 - 2 /hpf Final   • Epithelial Cells (non renal) 03/12/2019 None seen  0 - 10 /hpf Final   • Casts 03/12/2019 Present* None seen /lpf Final   • Cast Type 03/12/2019 Hyaline casts  N/A Final   • Mucus, UA 03/12/2019 Present  Not Estab. /hpf Final   • Bacteria, UA 03/12/2019 None  seen  None seen/Few /hpf Final        Obinna was seen today for annual exam.    Diagnoses and all orders for this visit:    Annual physical exam    Essential hypertension    Mixed hyperlipidemia      Will monitor chol off Rx for now. Encourage good lifestyle choices.    Encourage healthy diet and exercise.  Encourage patient to stay up to date on screening examinations as indicated based on age and risk factors.    EMR Dragon/Transcription disclaimer:    Much of this encounter note is an electronic transcription/translation of spoken language to printed text.  The electronic translation of spoken language may permit erroneous, or at times, nonsensical words or phrases to be inadvertently transcribed.  Although I have reviewed the note for such errors some may still exist.

## 2019-05-21 RX ORDER — CHLORTHALIDONE 25 MG/1
TABLET ORAL
Qty: 90 TABLET | Refills: 0 | Status: SHIPPED | OUTPATIENT
Start: 2019-05-21 | End: 2019-09-19 | Stop reason: SDUPTHER

## 2019-09-19 RX ORDER — CHLORTHALIDONE 25 MG/1
TABLET ORAL
Qty: 90 TABLET | Refills: 0 | Status: SHIPPED | OUTPATIENT
Start: 2019-09-19 | End: 2019-11-05 | Stop reason: SDUPTHER

## 2019-10-15 ENCOUNTER — HOSPITAL ENCOUNTER (EMERGENCY)
Facility: HOSPITAL | Age: 34
Discharge: HOME | End: 2019-10-16
Attending: EMERGENCY MEDICINE | Admitting: EMERGENCY MEDICINE

## 2019-10-15 ENCOUNTER — APPOINTMENT (OUTPATIENT)
Dept: CT IMAGING | Facility: HOSPITAL | Age: 34
End: 2019-10-15

## 2019-10-15 DIAGNOSIS — L03.314 CELLULITIS OF GROIN, RIGHT: Primary | ICD-10-CM

## 2019-10-15 LAB
ALBUMIN SERPL-MCNC: 4.9 G/DL (ref 3.5–5.2)
ALBUMIN/GLOB SERPL: 1.4 G/DL
ALP SERPL-CCNC: 62 U/L (ref 39–117)
ALT SERPL W P-5'-P-CCNC: 34 U/L (ref 1–41)
ANION GAP SERPL CALCULATED.3IONS-SCNC: 13.6 MMOL/L (ref 5–15)
AST SERPL-CCNC: 25 U/L (ref 1–40)
BASOPHILS # BLD AUTO: 0.07 10*3/MM3 (ref 0–0.2)
BASOPHILS NFR BLD AUTO: 0.6 % (ref 0–1.5)
BILIRUB SERPL-MCNC: 0.4 MG/DL (ref 0.2–1.2)
BUN BLD-MCNC: 14 MG/DL (ref 6–20)
BUN/CREAT SERPL: 18.2 (ref 7–25)
CALCIUM SPEC-SCNC: 9.5 MG/DL (ref 8.6–10.5)
CHLORIDE SERPL-SCNC: 93 MMOL/L (ref 98–107)
CO2 SERPL-SCNC: 29.4 MMOL/L (ref 22–29)
CREAT BLD-MCNC: 0.77 MG/DL (ref 0.76–1.27)
DEPRECATED RDW RBC AUTO: 44.1 FL (ref 37–54)
EOSINOPHIL # BLD AUTO: 0.42 10*3/MM3 (ref 0–0.4)
EOSINOPHIL NFR BLD AUTO: 3.4 % (ref 0.3–6.2)
ERYTHROCYTE [DISTWIDTH] IN BLOOD BY AUTOMATED COUNT: 13.4 % (ref 12.3–15.4)
GFR SERPL CREATININE-BSD FRML MDRD: 114 ML/MIN/1.73
GLOBULIN UR ELPH-MCNC: 3.5 GM/DL
GLUCOSE BLD-MCNC: 91 MG/DL (ref 65–99)
HCT VFR BLD AUTO: 42.7 % (ref 37.5–51)
HGB BLD-MCNC: 14.1 G/DL (ref 13–17.7)
IMM GRANULOCYTES # BLD AUTO: 0.05 10*3/MM3 (ref 0–0.05)
IMM GRANULOCYTES NFR BLD AUTO: 0.4 % (ref 0–0.5)
LYMPHOCYTES # BLD AUTO: 2.73 10*3/MM3 (ref 0.7–3.1)
LYMPHOCYTES NFR BLD AUTO: 22 % (ref 19.6–45.3)
MCH RBC QN AUTO: 29.5 PG (ref 26.6–33)
MCHC RBC AUTO-ENTMCNC: 33 G/DL (ref 31.5–35.7)
MCV RBC AUTO: 89.3 FL (ref 79–97)
MONOCYTES # BLD AUTO: 1.23 10*3/MM3 (ref 0.1–0.9)
MONOCYTES NFR BLD AUTO: 9.9 % (ref 5–12)
NEUTROPHILS # BLD AUTO: 7.93 10*3/MM3 (ref 1.7–7)
NEUTROPHILS NFR BLD AUTO: 63.7 % (ref 42.7–76)
NRBC BLD AUTO-RTO: 0 /100 WBC (ref 0–0.2)
PLATELET # BLD AUTO: 260 10*3/MM3 (ref 140–450)
PMV BLD AUTO: 11 FL (ref 6–12)
POTASSIUM BLD-SCNC: 3.5 MMOL/L (ref 3.5–5.2)
PROT SERPL-MCNC: 8.4 G/DL (ref 6–8.5)
RBC # BLD AUTO: 4.78 10*6/MM3 (ref 4.14–5.8)
SODIUM BLD-SCNC: 136 MMOL/L (ref 136–145)
WBC NRBC COR # BLD: 12.43 10*3/MM3 (ref 3.4–10.8)

## 2019-10-15 PROCEDURE — 85025 COMPLETE CBC W/AUTO DIFF WBC: CPT | Performed by: EMERGENCY MEDICINE

## 2019-10-15 PROCEDURE — 25010000002 IOPAMIDOL 61 % SOLUTION: Performed by: EMERGENCY MEDICINE

## 2019-10-15 PROCEDURE — 74177 CT ABD & PELVIS W/CONTRAST: CPT

## 2019-10-15 PROCEDURE — 99284 EMERGENCY DEPT VISIT MOD MDM: CPT

## 2019-10-15 PROCEDURE — 80053 COMPREHEN METABOLIC PANEL: CPT | Performed by: EMERGENCY MEDICINE

## 2019-10-15 RX ORDER — SODIUM CHLORIDE 0.9 % (FLUSH) 0.9 %
10 SYRINGE (ML) INJECTION AS NEEDED
Status: DISCONTINUED | OUTPATIENT
Start: 2019-10-15 | End: 2019-10-16 | Stop reason: HOSPADM

## 2019-10-15 RX ADMIN — IOPAMIDOL 85 ML: 612 INJECTION, SOLUTION INTRAVENOUS at 22:34

## 2019-10-16 VITALS
TEMPERATURE: 99.7 F | OXYGEN SATURATION: 99 % | RESPIRATION RATE: 15 BRPM | DIASTOLIC BLOOD PRESSURE: 87 MMHG | WEIGHT: 292 LBS | HEART RATE: 80 BPM | HEIGHT: 72 IN | SYSTOLIC BLOOD PRESSURE: 129 MMHG | BODY MASS INDEX: 39.55 KG/M2

## 2019-10-16 PROCEDURE — 96365 THER/PROPH/DIAG IV INF INIT: CPT

## 2019-10-16 RX ORDER — CLINDAMYCIN HYDROCHLORIDE 300 MG/1
300 CAPSULE ORAL 3 TIMES DAILY
Qty: 21 CAPSULE | Refills: 0 | Status: SHIPPED | OUTPATIENT
Start: 2019-10-16 | End: 2019-10-28

## 2019-10-16 RX ORDER — CLINDAMYCIN PHOSPHATE 600 MG/50ML
600 INJECTION INTRAVENOUS ONCE
Status: COMPLETED | OUTPATIENT
Start: 2019-10-16 | End: 2019-10-16

## 2019-10-16 RX ADMIN — CLINDAMYCIN PHOSPHATE 600 MG: 600 INJECTION, SOLUTION INTRAVENOUS at 00:59

## 2019-10-16 NOTE — ED TRIAGE NOTES
"Pt c/o right groin pain that began 1-2 days ago, states \"I think I have a hernia and I can't push it back in\". Pt arrives aaox4, abc's intact, ambulatory steady gait, appears uncomfortable.   "

## 2019-10-16 NOTE — ED PROVIDER NOTES
" EMERGENCY DEPARTMENT ENCOUNTER    CHIEF COMPLAINT  Chief Complaint: groin pain  History given by: patient  History limited by: nothing  Room Number: 29/29  PMD: Obinna Heard Jr., DO      HPI:  Pt is a 34 y.o. male who presents to the ED via private vehicle complaining of R sided groin pain starting 1-2 days ago, stating has a \"growth\" in the area that he has tried to push back in with no relief. Pt reports the growth has gotten larger, and pain worsens upon coughing. Also c/o fever (99.7 at Triage) that was improved after taking Tylenol 2 hours earlier. Denies abd or testicle pain. Pt took Ibuprofen as well for pain management with some relief.    MEDICAL RECORD REVIEW    No relevant prior records.    PAST MEDICAL HISTORY  Active Ambulatory Problems     Diagnosis Date Noted   • Essential hypertension 09/19/2017   • Mixed hyperlipidemia 09/19/2017     Resolved Ambulatory Problems     Diagnosis Date Noted   • No Resolved Ambulatory Problems     Past Medical History:   Diagnosis Date   • Hypertension    • Mixed hyperlipidemia 9/19/2017       PAST SURGICAL HISTORY  Past Surgical History:   Procedure Laterality Date   • KNEE SURGERY     • VASECTOMY         FAMILY HISTORY  Family History   Problem Relation Age of Onset   • Hypertension Father    • Hyperlipidemia Father    • Depression Father        SOCIAL HISTORY  Social History     Socioeconomic History   • Marital status:      Spouse name: Not on file   • Number of children: Not on file   • Years of education: Not on file   • Highest education level: Not on file   Tobacco Use   • Smoking status: Never Smoker   • Smokeless tobacco: Never Used   Substance and Sexual Activity   • Alcohol use: Yes   • Drug use: Defer   • Sexual activity: Defer       ALLERGIES  Patient has no known allergies.    REVIEW OF SYSTEMS  Review of Systems   Constitutional: Positive for fever. Negative for activity change and appetite change.   HENT: Negative for congestion and " sore throat.    Eyes: Negative.    Respiratory: Negative for cough and shortness of breath.    Cardiovascular: Negative for chest pain and leg swelling.   Gastrointestinal: Negative for abdominal pain, diarrhea and vomiting.   Endocrine: Negative.    Genitourinary: Negative for decreased urine volume, dysuria, scrotal swelling and testicular pain.        (+) R sided groin pain   Musculoskeletal: Negative for neck pain.   Skin: Negative for rash and wound.   Allergic/Immunologic: Negative.    Neurological: Negative for weakness, numbness and headaches.   Hematological: Negative.    Psychiatric/Behavioral: Negative.    All other systems reviewed and are negative.      All systems reviewed and negative except for those discussed in HPI.    PHYSICAL EXAM  ED Triage Vitals [10/15/19 2056]   Temp Heart Rate Resp BP SpO2   99.7 °F (37.6 °C) 109 18 -- 97 %      Temp src Heart Rate Source Patient Position BP Location FiO2 (%)   -- Monitor -- -- --       Physical Exam   Constitutional: No distress.   HENT:   Head: Normocephalic and atraumatic.   Eyes: EOM are normal.   Neck: Normal range of motion.   Pulmonary/Chest: No respiratory distress.   Abdominal: There is no tenderness.   Genitourinary:   Genitourinary Comments:  exam chaperoned by female RN. Pt has mildly erythematous and edemataous to R lateral pubis with a hardened non mobile mass without fluctuance. No edema, erythema, or tenderness to perineum nor scrotum.   Musculoskeletal: He exhibits no edema.   Neurological: He is alert.   Skin: Skin is warm and dry.   Nursing note and vitals reviewed.      LAB RESULTS  Lab Results (last 24 hours)     ** No results found for the last 24 hours. **          I ordered the above labs and reviewed the results.    RADIOLOGY  CT Abdomen Pelvis With Contrast   Final Result   IMPRESSION :    1. Pelvic adenopathy as discussed. Minimal inflammation of the   subcutaneous fat on the right side could be related to a nonspecific    cellulitis. Follow-up recommended to ensure resolution.   2. Diffuse fatty liver           This report was finalized on 10/16/2019 5:48 AM by Jorge Huynh M.D.               I ordered the above noted radiological studies. Interpreted by radiologist. Reviewed by me in PACS. See dictation for official radiology interpretation.      PROGRESS AND CONSULTS     2117- /94. Discussed w/ pt and family plan to obtain labs and imaging of pelvis, as well as start pt on IVF. Pt understands and agrees with the plan, all questions answered. Ordered labs and CT Abd for further evaluation. Also ordered IVF for sx management.    0014- Rechecked pt. Pt is resting comfortably. Notified pt of cellulitis. Discussed the plan to discharge the pt home with prescriptions for antibiotics, after giving dose of IV antibiotics in ED. I instructed the pt to follow up with PCP, and apply warm compresses to the area. Pt understands and agrees with the plan, all questions answered.    MEDICAL DECISION MAKING  Results were reviewed/discussed with the patient and they were also made aware of online access. Pt also made aware that some labs, such as cultures, will not be resulted during ER visit and follow up with PMD is necessary.          DIAGNOSIS  Final diagnoses:   Cellulitis of groin, right       DISPOSITION  DISCHARGE    Patient discharged in stable condition.    Reviewed implications of results, diagnosis, meds, responsibility to follow up, warning signs and symptoms of possible worsening, potential complications and reasons to return to ER, including worsening sx.    Patient/Family voiced understanding of above instructions.    Discussed plan for discharge, as there is no emergent indication for admission. Patient referred to primary care provider for BP management due to today's BP. Pt/family is agreeable and understands need for follow up and repeat testing.  Pt is aware that discharge does not mean that nothing is wrong but it  indicates no emergency is present that requires admission and they must continue care with follow-up as given below or physician of their choice.     FOLLOW-UP  Obinna Heard Jr., DO  [address] [facility] [address]  [place] [id number] [phone]    Schedule an appointment as soon as possible for a visit            Medication List      New Prescriptions    clindamycin 300 MG capsule  Commonly known as:  CLEOCIN  Take 1 capsule by mouth 3 (Three) Times a Day.              Latest Documented Vital Signs:  As of 10:40 PM  BP- 129/87 HR- 80 Temp- 99.7 °F (37.6 °C) O2 sat- 99%    --  Documentation assistance provided by jason Ruff for Dr. Sims.  Information recorded by the scribe was done at my direction and has been verified and validated by me.     Johana Ruff  10/16/19 9299       Timothy Sims MD  10/16/19 0101

## 2019-10-28 ENCOUNTER — OFFICE VISIT (OUTPATIENT)
Dept: SPORTS MEDICINE | Facility: CLINIC | Age: 34
End: 2019-10-28

## 2019-10-28 VITALS
OXYGEN SATURATION: 98 % | HEIGHT: 72 IN | DIASTOLIC BLOOD PRESSURE: 80 MMHG | BODY MASS INDEX: 39.82 KG/M2 | SYSTOLIC BLOOD PRESSURE: 126 MMHG | HEART RATE: 91 BPM | WEIGHT: 294 LBS

## 2019-10-28 DIAGNOSIS — E66.9 OBESITY (BMI 35.0-39.9 WITHOUT COMORBIDITY): ICD-10-CM

## 2019-10-28 DIAGNOSIS — I10 ESSENTIAL HYPERTENSION: ICD-10-CM

## 2019-10-28 DIAGNOSIS — R29.818 SUSPECTED SLEEP APNEA: ICD-10-CM

## 2019-10-28 DIAGNOSIS — Z87.2 HISTORY OF CELLULITIS: Primary | ICD-10-CM

## 2019-10-28 PROCEDURE — 99213 OFFICE O/P EST LOW 20 MIN: CPT | Performed by: FAMILY MEDICINE

## 2019-10-28 NOTE — PROGRESS NOTES
"Obinna is a 34 y.o. year old male evaluation of a problem that is new to this examiner.    Chief Complaint   Patient presents with   • Follow-up     ER f/u - for bacterial infection - x 10/15/2019       History of Present Illness   HPI     1. R groin cellulitis: went to ER 10/15/19 with c/o possible hernia. Labs - WBCS 12k, CT abd, pelvis - LAD, cellulitis. D/c home w/clinda which he has completed. Never had drainage. Low grade fever resolved.  2. Suspected PALMA: has snored for years. Wife bothered by it. Understands weight is contributory. Also has HTN.    I have reviewed the patient's medical, family, and social history in detail and updated the computerized patient record.    Review of Systems   Constitutional: Negative for chills, fatigue and fever.   HENT: Negative for congestion, rhinorrhea and sinus pressure.    Respiratory: Negative for chest tightness and shortness of breath.    Cardiovascular: Negative for chest pain.   Gastrointestinal: Negative for abdominal pain.   Musculoskeletal: Negative for arthralgias.   Skin: Negative for rash.   Neurological: Negative for numbness and headaches.   All other systems reviewed and are negative.      /80   Pulse 91   Ht 182.9 cm (72.01\")   Wt 133 kg (294 lb)   SpO2 98%   BMI 39.86 kg/m²      Physical Exam   Constitutional: He is oriented to person, place, and time. Vital signs are normal. He appears well-developed and well-nourished.   HENT:   Head: Normocephalic and atraumatic.   Eyes: Conjunctivae and EOM are normal.   Pulmonary/Chest: No accessory muscle usage. No respiratory distress.   Musculoskeletal: He exhibits no deformity.   Neurological: He is alert and oriented to person, place, and time.   Skin: Skin is warm and dry. No rash noted.   Psychiatric: He has a normal mood and affect. His behavior is normal.   Nursing note and vitals reviewed.        Current Outpatient Medications:   •  chlorthalidone (HYGROTON) 25 MG tablet, TAKE 1 TABLET EVERY " DAY, Disp: 90 tablet, Rfl: 0  •  metoprolol succinate XL (TOPROL-XL) 50 MG 24 hr tablet, Take 1 tablet by mouth Daily., Disp: 90 tablet, Rfl: 3     Diagnoses and all orders for this visit:    History of cellulitis    Suspected sleep apnea  -     Ambulatory Referral to Sleep Medicine    Essential hypertension    Obesity (BMI 35.0-39.9 without comorbidity)       1. Resolved. No further intervention.  2. Refer for sleep study.  3. Explained that PALMA can be contributory.    EMR Dragon/Transcription disclaimer:    Much of this encounter note is an electronic transcription/translation of spoken language to printed text.  The electronic translation of spoken language may permit erroneous, or at times, nonsensical words or phrases to be inadvertently transcribed.  Although I have reviewed the note for such errors some may still exist.

## 2019-11-04 ENCOUNTER — PATIENT MESSAGE (OUTPATIENT)
Dept: SPORTS MEDICINE | Facility: CLINIC | Age: 34
End: 2019-11-04

## 2019-11-04 DIAGNOSIS — I10 ESSENTIAL HYPERTENSION: ICD-10-CM

## 2019-11-05 RX ORDER — METOPROLOL SUCCINATE 50 MG/1
50 TABLET, EXTENDED RELEASE ORAL DAILY
Qty: 30 TABLET | Refills: 0 | Status: SHIPPED | OUTPATIENT
Start: 2019-11-05 | End: 2019-11-28 | Stop reason: SDUPTHER

## 2019-11-05 RX ORDER — CHLORTHALIDONE 25 MG/1
25 TABLET ORAL DAILY
Qty: 30 TABLET | Refills: 0 | Status: SHIPPED | OUTPATIENT
Start: 2019-11-05 | End: 2019-11-29 | Stop reason: SDUPTHER

## 2019-11-05 RX ORDER — METOPROLOL SUCCINATE 50 MG/1
TABLET, EXTENDED RELEASE ORAL
Qty: 90 TABLET | Refills: 1 | Status: SHIPPED | OUTPATIENT
Start: 2019-11-05 | End: 2019-11-05 | Stop reason: SDUPTHER

## 2019-11-11 ENCOUNTER — OFFICE VISIT (OUTPATIENT)
Dept: SLEEP MEDICINE | Facility: HOSPITAL | Age: 34
End: 2019-11-11

## 2019-11-11 VITALS
BODY MASS INDEX: 38.33 KG/M2 | HEIGHT: 72 IN | OXYGEN SATURATION: 96 % | HEART RATE: 93 BPM | DIASTOLIC BLOOD PRESSURE: 83 MMHG | SYSTOLIC BLOOD PRESSURE: 152 MMHG | WEIGHT: 283 LBS

## 2019-11-11 DIAGNOSIS — I10 BENIGN ESSENTIAL HTN: ICD-10-CM

## 2019-11-11 DIAGNOSIS — G47.10 HYPERSOMNIA WITH SLEEP APNEA: ICD-10-CM

## 2019-11-11 DIAGNOSIS — E66.9 OBESITY (BMI 30-39.9): ICD-10-CM

## 2019-11-11 DIAGNOSIS — R06.83 SNORES: ICD-10-CM

## 2019-11-11 DIAGNOSIS — G47.30 HYPERSOMNIA WITH SLEEP APNEA: ICD-10-CM

## 2019-11-11 DIAGNOSIS — R29.818 SUSPECTED SLEEP APNEA: Primary | ICD-10-CM

## 2019-11-11 PROCEDURE — G0463 HOSPITAL OUTPT CLINIC VISIT: HCPCS

## 2019-11-11 NOTE — PROGRESS NOTES
Sleep Consultation    Patient Name: Obinna White  Age/Sex: 34 y.o. male  : 1985  MRN: [id number]    Date of Encounter Visit: 2019  Encounter Provider: Theron Rivera MD  Referring Provider: Obinna Heard *  Place of Service: [facility]  Patient Care Team:  Obinna Heard Jr., DO as PCP - General (Family Medicine)    Subjective:     Reason for Consult: Evaluation for suspected sleep apnea    History of Present Illness:  Obinna White is a 34 y.o. male is here for evaluation of PALMA due to abnormal breathing pattern at night.  Patient was suspected to have sleep apnea years before and had a home sleep study in  however he did not have a frequent events to qualify for a CPAP and was not started on any treatment.  Over the past 6 years he did gain more than 40 pounds his snoring is much louder and his breathing is more irregular at night according to the wife who grew overly concerned and wanted him to be reevaluated.  Patient has comorbidities with hypertension, he has loud snoring in all position, he does wake up gasping for breath, he does have subjective hypersomnia with [place] Sleepiness Scale of 9, and does have frequent leg jerking at night reported by the wife, he does have some night sweats and bruxism.    Please see sleep questionnaire on page 2 for more details.   Patient complains of daytime fatigue and sleepiness with an [place] Sleepiness Scale (ESS) of 9.  Patient complains of snoring, restless sleep, night sweats, waking up gasping, and grinding the teeth  Denies any symptoms of restless leg syndrome.   Patient denies any cataplexy, sleep paralysis or other symptoms to suggest narcolepsy.  Patient denies any parasomnias.  Denies any history of seizure disorder or recent head trauma.  Patient spends adequate amount of time in bed with no evidence of sleep restriction or improper sleep hygiene.    Comorbidities include:  "Obesity/hypertension    Review of Systems:   A twelve-system review was conducted and was negative except as mentioned above also positive for nosebleed, arthritis pain, anxiety and depression and heat intolerance.   Please refer to page 4 of on the sleep questionnaire for more details on system review findings.    Past Medical History:  Past Medical History:   Diagnosis Date   • Hypertension    • Mixed hyperlipidemia 9/19/2017   • Obesity (BMI 30-39.9) 11/11/2019       Past Surgical History:   Procedure Laterality Date   • KNEE SURGERY     • VASECTOMY         Home Medications:     Current Outpatient Medications:   •  chlorthalidone (HYGROTON) 25 MG tablet, Take 1 tablet by mouth Daily., Disp: 30 tablet, Rfl: 0  •  metoprolol succinate XL (TOPROL-XL) 50 MG 24 hr tablet, Take 1 tablet by mouth Daily., Disp: 30 tablet, Rfl: 0    Allergies:  No Known Allergies    Past Social History:  Social History     Socioeconomic History   • Marital status:      Spouse name: Not on file   • Number of children: Not on file   • Years of education: Not on file   • Highest education level: Not on file   Tobacco Use   • Smoking status: Never Smoker   • Smokeless tobacco: Never Used   Substance and Sexual Activity   • Alcohol use: Yes   • Drug use: Defer   • Sexual activity: Defer       Past Family History:  Family History   Problem Relation Age of Onset   • Hypertension Father    • Hyperlipidemia Father    • Depression Father    His father has obstructive sleep apnea     Objective:   Done and documented on sleep disorders center physical examination sheet, please refer to the hand written note on records for details about the pertinent negative findings.    Vital Signs:   Visit Vitals  /83   Pulse 93   Ht 182.9 cm (72\")   Wt 128 kg (283 lb)   SpO2 96%   BMI 38.38 kg/m²     Wt Readings from Last 3 Encounters:   11/11/19 128 kg (283 lb)   10/28/19 133 kg (294 lb)   10/15/19 132 kg (292 lb)     Neck Circumference: 18.5 " inches    Physical Exam:   General: AAOx3. Normal mood and affect.   HEENT:  Conjunctiva normal.  PERRLA.  Moist mucous membranes.  Septum midline. Mallampati IVairway.  Large uvula with large tongue  Neck: Neck supple. Trachea midline.  Normal thyroid.    LUNGS: Non-labored breathing. CTAB.  No wheezing.  HEART: regular rate and rhythm. No murmur. Normal s1/s2.  EXTREMITIES: no edema. No cyanosis or clubbing. Normal gait.    Diagnostic Data:  Copy on his previous sleep study but was reported normal by the patient in 2012, patient gained 40 pounds since    Assessment and Plan:       ICD-10-CM ICD-9-CM   1. Suspected sleep apnea R29.818 781.99   2. Snores R06.83 786.09   3. Hypersomnia with sleep apnea G47.10 780.53    G47.30    4. Obesity (BMI 30-39.9) E66.9 278.00   5. Benign essential HTN I10 401.1       Recommendations:        Patient was educated in depth about PALMA and cardiovascular consequences if left untreated, including but not limited to CHF, CAD, arrhythmias, CVA, and/or HTN. Education also provided about the diagnostic tools for PALMA, including the polysomnography and the treatment modalities, including the CPAP.     If patient has a mild obstructive sleep apnea will schedule a follow up to discuss treatment options including mandibular advancement device versus CPAP.      If patient has moderate to severe sleep apnea the recommend treatment is CPAP and will start CPAP and patient will follow up within 31-90 days after starting CPAP for compliance review.     Adherence to the CPAP is a key factor in successful treatment of PALMA and the patient was encouraged to contact us in case of problem with the CPAP or the mask that can limit the tolerance of the compliance with the therapy.    We did discuss the impact of weight on sleep apnea and the benefit of weight loss and weight loss is recommended    Orders Placed This Encounter   Procedures   • Home Sleep Study     No orders of the defined types were placed  in this encounter.     Return for 31 to 90 days after PAP setup.    Theron Rivera MD   [facility]   11/11/19  5:14 PM    Dictated utilizing Dragon dictation

## 2019-11-28 DIAGNOSIS — I10 ESSENTIAL HYPERTENSION: ICD-10-CM

## 2019-12-02 RX ORDER — METOPROLOL SUCCINATE 50 MG/1
TABLET, EXTENDED RELEASE ORAL
Qty: 90 TABLET | Refills: 1 | Status: SHIPPED | OUTPATIENT
Start: 2019-12-02 | End: 2020-05-05

## 2019-12-02 RX ORDER — CHLORTHALIDONE 25 MG/1
TABLET ORAL
Qty: 90 TABLET | Refills: 1 | Status: SHIPPED | OUTPATIENT
Start: 2019-12-02 | End: 2020-05-01

## 2019-12-12 ENCOUNTER — HOSPITAL ENCOUNTER (OUTPATIENT)
Dept: SLEEP MEDICINE | Facility: HOSPITAL | Age: 34
Discharge: HOME | End: 2019-12-12
Admitting: INTERNAL MEDICINE

## 2019-12-12 DIAGNOSIS — G47.30 HYPERSOMNIA WITH SLEEP APNEA: ICD-10-CM

## 2019-12-12 DIAGNOSIS — R29.818 SUSPECTED SLEEP APNEA: ICD-10-CM

## 2019-12-12 DIAGNOSIS — E66.9 OBESITY (BMI 30-39.9): ICD-10-CM

## 2019-12-12 DIAGNOSIS — G47.10 HYPERSOMNIA WITH SLEEP APNEA: ICD-10-CM

## 2019-12-12 DIAGNOSIS — R06.83 SNORES: ICD-10-CM

## 2019-12-12 DIAGNOSIS — I10 BENIGN ESSENTIAL HTN: ICD-10-CM

## 2019-12-12 PROCEDURE — 95806 SLEEP STUDY UNATT&RESP EFFT: CPT

## 2020-01-07 ENCOUNTER — TELEPHONE (OUTPATIENT)
Dept: SLEEP MEDICINE | Facility: HOSPITAL | Age: 35
End: 2020-01-07

## 2020-01-20 ENCOUNTER — TELEPHONE (OUTPATIENT)
Dept: SLEEP MEDICINE | Facility: HOSPITAL | Age: 35
End: 2020-01-20

## 2020-03-02 ENCOUNTER — APPOINTMENT (OUTPATIENT)
Dept: SLEEP MEDICINE | Facility: HOSPITAL | Age: 35
End: 2020-03-02

## 2020-03-13 ENCOUNTER — LAB (OUTPATIENT)
Dept: SPORTS MEDICINE | Facility: CLINIC | Age: 35
End: 2020-03-13

## 2020-03-13 ENCOUNTER — RESULTS ENCOUNTER (OUTPATIENT)
Dept: SPORTS MEDICINE | Facility: CLINIC | Age: 35
End: 2020-03-13

## 2020-03-13 DIAGNOSIS — E78.2 MIXED HYPERLIPIDEMIA: ICD-10-CM

## 2020-03-13 DIAGNOSIS — I10 BENIGN ESSENTIAL HTN: ICD-10-CM

## 2020-03-13 DIAGNOSIS — Z00.00 ANNUAL PHYSICAL EXAM: ICD-10-CM

## 2020-03-13 DIAGNOSIS — I10 BENIGN ESSENTIAL HTN: Primary | ICD-10-CM

## 2020-03-14 LAB
ALBUMIN SERPL-MCNC: 4.6 G/DL (ref 3.5–5.2)
ALBUMIN/GLOB SERPL: 1.3 G/DL
ALP SERPL-CCNC: 59 U/L (ref 39–117)
ALT SERPL-CCNC: 55 U/L (ref 1–41)
AST SERPL-CCNC: 36 U/L (ref 1–40)
BASOPHILS # BLD AUTO: 0.08 10*3/MM3 (ref 0–0.2)
BASOPHILS NFR BLD AUTO: 0.8 % (ref 0–1.5)
BILIRUB SERPL-MCNC: 0.4 MG/DL (ref 0.2–1.2)
BUN SERPL-MCNC: 12 MG/DL (ref 6–20)
BUN/CREAT SERPL: 15.8 (ref 7–25)
CALCIUM SERPL-MCNC: 9.7 MG/DL (ref 8.6–10.5)
CHLORIDE SERPL-SCNC: 93 MMOL/L (ref 98–107)
CHOLEST SERPL-MCNC: 199 MG/DL (ref 0–200)
CO2 SERPL-SCNC: 29.7 MMOL/L (ref 22–29)
CREAT SERPL-MCNC: 0.76 MG/DL (ref 0.76–1.27)
EOSINOPHIL # BLD AUTO: 0.37 10*3/MM3 (ref 0–0.4)
EOSINOPHIL NFR BLD AUTO: 3.7 % (ref 0.3–6.2)
ERYTHROCYTE [DISTWIDTH] IN BLOOD BY AUTOMATED COUNT: 13.2 % (ref 12.3–15.4)
GLOBULIN SER CALC-MCNC: 3.5 GM/DL
GLUCOSE SERPL-MCNC: 96 MG/DL (ref 65–99)
HBA1C MFR BLD: 6.1 % (ref 4.8–5.6)
HCT VFR BLD AUTO: 44.8 % (ref 37.5–51)
HDLC SERPL-MCNC: 34 MG/DL (ref 40–60)
HGB BLD-MCNC: 15.1 G/DL (ref 13–17.7)
IMM GRANULOCYTES # BLD AUTO: 0.03 10*3/MM3 (ref 0–0.05)
IMM GRANULOCYTES NFR BLD AUTO: 0.3 % (ref 0–0.5)
LDLC SERPL CALC-MCNC: 109 MG/DL (ref 0–100)
LYMPHOCYTES # BLD AUTO: 3 10*3/MM3 (ref 0.7–3.1)
LYMPHOCYTES NFR BLD AUTO: 30.1 % (ref 19.6–45.3)
MCH RBC QN AUTO: 30.5 PG (ref 26.6–33)
MCHC RBC AUTO-ENTMCNC: 33.7 G/DL (ref 31.5–35.7)
MCV RBC AUTO: 90.5 FL (ref 79–97)
MONOCYTES # BLD AUTO: 0.78 10*3/MM3 (ref 0.1–0.9)
MONOCYTES NFR BLD AUTO: 7.8 % (ref 5–12)
NEUTROPHILS # BLD AUTO: 5.71 10*3/MM3 (ref 1.7–7)
NEUTROPHILS NFR BLD AUTO: 57.3 % (ref 42.7–76)
NRBC BLD AUTO-RTO: 0 /100 WBC (ref 0–0.2)
PLATELET # BLD AUTO: 307 10*3/MM3 (ref 140–450)
POTASSIUM SERPL-SCNC: 3.8 MMOL/L (ref 3.5–5.2)
PROT SERPL-MCNC: 8.1 G/DL (ref 6–8.5)
RBC # BLD AUTO: 4.95 10*6/MM3 (ref 4.14–5.8)
SODIUM SERPL-SCNC: 137 MMOL/L (ref 136–145)
TRIGL SERPL-MCNC: 280 MG/DL (ref 0–150)
VLDLC SERPL CALC-MCNC: 56 MG/DL
WBC # BLD AUTO: 9.97 10*3/MM3 (ref 3.4–10.8)

## 2020-03-16 ENCOUNTER — OFFICE VISIT (OUTPATIENT)
Dept: SLEEP MEDICINE | Facility: HOSPITAL | Age: 35
End: 2020-03-16

## 2020-03-16 VITALS
HEIGHT: 72 IN | WEIGHT: 302 LBS | DIASTOLIC BLOOD PRESSURE: 82 MMHG | BODY MASS INDEX: 40.9 KG/M2 | SYSTOLIC BLOOD PRESSURE: 152 MMHG | OXYGEN SATURATION: 96 % | HEART RATE: 89 BPM

## 2020-03-16 DIAGNOSIS — I10 BENIGN ESSENTIAL HTN: ICD-10-CM

## 2020-03-16 DIAGNOSIS — G47.34 SLEEP RELATED HYPOXIA: ICD-10-CM

## 2020-03-16 DIAGNOSIS — Z99.89 OSA ON CPAP: Primary | ICD-10-CM

## 2020-03-16 DIAGNOSIS — G47.10 HYPERSOMNIA WITH SLEEP APNEA: ICD-10-CM

## 2020-03-16 DIAGNOSIS — G47.30 HYPERSOMNIA WITH SLEEP APNEA: ICD-10-CM

## 2020-03-16 DIAGNOSIS — G47.33 OSA ON CPAP: Primary | ICD-10-CM

## 2020-03-16 DIAGNOSIS — E66.9 OBESITY (BMI 30-39.9): ICD-10-CM

## 2020-03-16 PROCEDURE — G0463 HOSPITAL OUTPT CLINIC VISIT: HCPCS

## 2020-03-16 NOTE — PROGRESS NOTES
Sleep Disorder Follow Up    Patient Name: Obinna White  Age/Sex: 34 y.o. male  : 1985  MRN: [id number]    Date of Encounter Visit: 20    Referring Provider: Theron Rivera MD  Place of Service: [facility]  Patient Care Team:  Obinna Heard Jr., DO as PCP - General (Family Medicine)    PROBLEM LIST:  1. Mild PALMA  2. Mild SRH  3. Hypersomnia with sleep apnea  4. Obesity   5. HTN      History of Present Illness:  Obinna White is a 34 y.o. male who is here for follow up on PALMA. Patient was last seen in the office on 2019.  Since last visit, he had a HST on 2019 that was positive for mild PALMA with mild SRH and was started on Auto CPAP 6-20 and he is here for his first follow up visit post CPAP set up .  Patient uses machine every night with no complaints from the mask or the pressure.  Patient uses a [employer] full face mask, which does  fit well. Patient denies significant air leak. Positive or dry mouth.   Patient's equipment supplier is [employer] and last mask replacement was  2 months ago.  Patient sleeps better and has a deeper sleep with the machine and feels more energy during the day time.  Currently on Autp CPAP 6-20 cm H20.  Nesmith Sleepiness Scale (ESS) is  6.  Compliance download was reviewed and documented below.  Weight is up 19 pounds since last visit.  Other comorbidities include HTN, PALMA, SRH,Obesity      Review of Systems:   A ten-system review was conducted and was negative except for post nasal drainage, cough, and anxiety .   Please refer to the follow up sleep questionnaire page one for details.    Past Medical History:  Past medical, surgical, social, and family history, except as mentioned above, was unchanged from the last visit.     Past Medical History:   Diagnosis Date   • Hypertension    • Mixed hyperlipidemia 2017   • Obesity (BMI 30-39.9) 2019       Past Surgical History:   Procedure Laterality Date   • KNEE  "SURGERY     • VASECTOMY         Home Medications:     Current Outpatient Medications:   •  chlorthalidone (HYGROTON) 25 MG tablet, TAKE 1 TABLET EVERY DAY, Disp: 90 tablet, Rfl: 1  •  metoprolol succinate XL (TOPROL-XL) 50 MG 24 hr tablet, TAKE 1 TABLET EVERY DAY, Disp: 90 tablet, Rfl: 1    Allergies:  No Known Allergies    Past Social History:  Social History     Socioeconomic History   • Marital status:      Spouse name: Not on file   • Number of children: Not on file   • Years of education: Not on file   • Highest education level: Not on file   Tobacco Use   • Smoking status: Never Smoker   • Smokeless tobacco: Never Used   Substance and Sexual Activity   • Alcohol use: Yes   • Drug use: Defer   • Sexual activity: Defer       Past Family History:  Family History   Problem Relation Age of Onset   • Hypertension Father    • Hyperlipidemia Father    • Depression Father          Objective:   Done and documented on sleep disorders center physical examination sheet, please refer to hand written note on the chart for details about the other pertinent negative findings.    Vital Signs:   Visit Vitals  /82   Pulse 89   Ht 182.9 cm (72\")   Wt (!) 137 kg (302 lb)   SpO2 96%   BMI 40.96 kg/m²     Wt Readings from Last 3 Encounters:   03/16/20 (!) 137 kg (302 lb)   11/11/19 128 kg (283 lb)   10/28/19 133 kg (294 lb)          Physical Exam:   General: AAOx3. Normal mood and affect.   HEENT:  Conjunctiva normal.  PERRLA.  Moist mucous membranes.  Septum midline. Mallampati IVairway.  Large uvula with large tongue  Neck: Neck supple. Trachea midline.  Normal thyroid.    LUNGS: Non-labored breathing. CTAB.  No wheezing.  HEART: regular rate and rhythm. No murmur. Normal s1/s2.  EXTREMITIES: no edema. No cyanosis or clubbing. Normal gait.    Diagnostic Data:  Methods: HST 12/13/2019.         Respiratory Disturbance Events:   This was an overnight home sleep study done on 12/12/2019  Total recording time 408.5 minutes " with monitoring time of 389.5 minutes.  Respiratory summary showed mild PALMA AHI 9.7 with a supine component of 17.4.  Vasquez desaturation down to 83% with 7 minutes spent in the hypoxemic range with a clustering pattern during supine sleep noted.  Snoring was moderate and recorded during 28.7% of total monitoring time     Impression:   Mild PALMA with moderate supine component  Mild sleep-related hypoxemia due to the above     Plan:   Treatment is indicated given the symptoms, the patient will be started on auto CPAP 6-20  Follow-up on the clinical response with further recommendations accordingly    Compliance download from last 56 days showed 100%, 6 hours and 58 min, AHI 0.2, air leak median 6.0 lpm and 95th% 26.2 lpm, median pressure 7.3 and 95th% 9.7 cm H2O      Assessment and Plan:       ICD-10-CM ICD-9-CM   1. PALMA on CPAP G47.33 327.23    Z99.89 V46.8   2. Benign essential HTN I10 401.1   3. Obesity (BMI 30-39.9) E66.9 278.00   4. Sleep related hypoxia G47.34 327.24   5. Hypersomnia with sleep apnea G47.10 780.53    G47.30        Recommendations:     Patient is compliant with the CPAP as evident from the compliance download  Patient is getting both clinical and subjective benefit from the use of the CPAP machine  The CPAP machine is effective in controlling the underlying sleep apnea  CPAP is strongly recommended to be continued  Patient to continue work on the weight loss  No pressure adjustment recommended  Continue with a yearly follow-up or sooner as needed  Advised the patient to consider using the chinstrap to help with the mouth dryness      No orders of the defined types were placed in this encounter.    No orders of the defined types were placed in this encounter.    Return in about 1 year (around 3/16/2021).    Theron Rivera MD   [facility]   03/16/20  10:53    Dictated utilizing Dragon dictation

## 2020-05-01 RX ORDER — CHLORTHALIDONE 25 MG/1
TABLET ORAL
Qty: 90 TABLET | Refills: 3 | Status: SHIPPED | OUTPATIENT
Start: 2020-05-01 | End: 2021-05-06

## 2020-05-05 DIAGNOSIS — I10 ESSENTIAL HYPERTENSION: ICD-10-CM

## 2020-05-05 RX ORDER — METOPROLOL SUCCINATE 50 MG/1
TABLET, EXTENDED RELEASE ORAL
Qty: 90 TABLET | Refills: 3 | Status: SHIPPED | OUTPATIENT
Start: 2020-05-05 | End: 2021-03-29

## 2020-08-11 ENCOUNTER — OFFICE VISIT (OUTPATIENT)
Dept: SPORTS MEDICINE | Facility: CLINIC | Age: 35
End: 2020-08-11

## 2020-08-11 VITALS
HEART RATE: 86 BPM | DIASTOLIC BLOOD PRESSURE: 80 MMHG | BODY MASS INDEX: 38.6 KG/M2 | SYSTOLIC BLOOD PRESSURE: 140 MMHG | WEIGHT: 285 LBS | OXYGEN SATURATION: 100 % | HEIGHT: 72 IN

## 2020-08-11 DIAGNOSIS — Z00.00 ANNUAL PHYSICAL EXAM: Primary | ICD-10-CM

## 2020-08-11 DIAGNOSIS — Z99.89 OSA ON CPAP: ICD-10-CM

## 2020-08-11 DIAGNOSIS — E78.2 MIXED HYPERLIPIDEMIA: ICD-10-CM

## 2020-08-11 DIAGNOSIS — G47.33 OSA ON CPAP: ICD-10-CM

## 2020-08-11 DIAGNOSIS — I10 BENIGN ESSENTIAL HTN: ICD-10-CM

## 2020-08-11 DIAGNOSIS — F90.0 ATTENTION DEFICIT HYPERACTIVITY DISORDER (ADHD), PREDOMINANTLY INATTENTIVE TYPE: ICD-10-CM

## 2020-08-11 DIAGNOSIS — R73.03 PREDIABETES: ICD-10-CM

## 2020-08-11 DIAGNOSIS — Z23 IMMUNIZATION DUE: ICD-10-CM

## 2020-08-11 PROBLEM — R06.83 SNORES: Status: RESOLVED | Noted: 2019-11-11 | Resolved: 2020-08-11

## 2020-08-11 PROBLEM — G47.30 HYPERSOMNIA WITH SLEEP APNEA: Status: RESOLVED | Noted: 2019-11-11 | Resolved: 2020-08-11

## 2020-08-11 PROBLEM — G47.10 HYPERSOMNIA WITH SLEEP APNEA: Status: RESOLVED | Noted: 2019-11-11 | Resolved: 2020-08-11

## 2020-08-11 PROBLEM — R29.818 SUSPECTED SLEEP APNEA: Status: RESOLVED | Noted: 2019-11-11 | Resolved: 2020-08-11

## 2020-08-11 PROCEDURE — 90471 IMMUNIZATION ADMIN: CPT | Performed by: FAMILY MEDICINE

## 2020-08-11 PROCEDURE — 99395 PREV VISIT EST AGE 18-39: CPT | Performed by: FAMILY MEDICINE

## 2020-08-11 PROCEDURE — 90715 TDAP VACCINE 7 YRS/> IM: CPT | Performed by: FAMILY MEDICINE

## 2020-08-11 NOTE — PROGRESS NOTES
"Obinna White is here today for an annual physical exam.     Eating a healthy diet. Exercising routinely. Has lost approx 24# since start of yr.    HTN: Rx as prescribed. Has not been affected by stimulant.  PALMA on CPAP. \"100% compliant.\"    Went to psychiatrist at start of pandemic and has been on Vyvanse. Has helped w/mood, also binge eating.    I have reviewed the patient's medical, family, and social history in detail and updated the computerized patient record.    Health Maintenance   Topic Date Due   • HEPATITIS C SCREENING  05/19/2017   • ANNUAL PHYSICAL  03/20/2020   • INFLUENZA VACCINE  08/01/2020   • TDAP/TD VACCINES (2 - Td) 01/01/2021   • LIPID PANEL  03/13/2021       PHQ-2 Depression Screening  Little interest or pleasure in doing things? 0   Feeling down, depressed, or hopeless? 0   PHQ-2 Total Score 0       Review of Systems  Constitutional: Negative for chills, fatigue and fever.   HENT: Negative for postnasal drip and sore throat.    Eyes: Negative for pain.   Respiratory: Negative for cough, chest tightness and wheezing.    Cardiovascular: Negative for chest pain.   Gastrointestinal: Negative.    Musculoskeletal: Negative for myalgias.   Skin: Negative for rash.   Neurological: Negative for numbness and headaches.   Psychiatric/Behavioral: Negative.    All other systems reviewed and are negative.    /80 (BP Location: Left arm, Patient Position: Sitting, Cuff Size: Adult)   Pulse 86   Ht 182.9 cm (72\")   Wt 129 kg (285 lb)   SpO2 100%   BMI 38.65 kg/m²      Physical Exam    Vital signs reviewed.  General appearance: No acute distress  Eyes: conjunctiva clear without erythema; pupils equally round and reactive  ENT: external ears and nose normal; hearing normal, oropharynx clear  Neck: supple; no thyromegaly  CV: normal rate and rhythm; no peripheral edema  Respiratory: normal respiratory effort; lungs clear to auscultation bilaterally  MSK: normal gait and station; no focal joint " deformity or swelling  Skin: no rash or wounds; normal turgor  Neuro: cranial nerves 2-12 grossly intact; normal sensation to light touch  Psych: mood and affect normal; recent and remote memory intact    No visits with results within 2 Week(s) from this visit.   Latest known visit with results is:   Results Encounter on 03/13/2020   Component Date Value Ref Range Status   • Total Cholesterol 03/13/2020 199  0 - 200 mg/dL Final   • Triglycerides 03/13/2020 280* 0 - 150 mg/dL Final   • HDL Cholesterol 03/13/2020 34* 40 - 60 mg/dL Final   • VLDL Cholesterol 03/13/2020 56  mg/dL Final   • LDL Cholesterol  03/13/2020 109* 0 - 100 mg/dL Final   • Glucose 03/13/2020 96  65 - 99 mg/dL Final   • BUN 03/13/2020 12  6 - 20 mg/dL Final   • Creatinine 03/13/2020 0.76  0.76 - 1.27 mg/dL Final   • eGFR Non African Am 03/13/2020 116  >60 mL/min/1.73 Final   • eGFR African Am 03/13/2020 141  >60 mL/min/1.73 Final   • BUN/Creatinine Ratio 03/13/2020 15.8  7.0 - 25.0 Final   • Sodium 03/13/2020 137  136 - 145 mmol/L Final   • Potassium 03/13/2020 3.8  3.5 - 5.2 mmol/L Final   • Chloride 03/13/2020 93* 98 - 107 mmol/L Final   • Total CO2 03/13/2020 29.7* 22.0 - 29.0 mmol/L Final   • Calcium 03/13/2020 9.7  8.6 - 10.5 mg/dL Final   • Total Protein 03/13/2020 8.1  6.0 - 8.5 g/dL Final   • Albumin 03/13/2020 4.60  3.50 - 5.20 g/dL Final   • Globulin 03/13/2020 3.5  gm/dL Final   • A/G Ratio 03/13/2020 1.3  g/dL Final   • Total Bilirubin 03/13/2020 0.4  0.2 - 1.2 mg/dL Final   • Alkaline Phosphatase 03/13/2020 59  39 - 117 U/L Final   • AST (SGOT) 03/13/2020 36  1 - 40 U/L Final   • ALT (SGPT) 03/13/2020 55* 1 - 41 U/L Final   • WBC 03/13/2020 9.97  3.40 - 10.80 10*3/mm3 Final   • RBC 03/13/2020 4.95  4.14 - 5.80 10*6/mm3 Final   • Hemoglobin 03/13/2020 15.1  13.0 - 17.7 g/dL Final   • Hematocrit 03/13/2020 44.8  37.5 - 51.0 % Final   • MCV 03/13/2020 90.5  79.0 - 97.0 fL Final   • MCH 03/13/2020 30.5  26.6 - 33.0 pg Final   • MCHC  03/13/2020 33.7  31.5 - 35.7 g/dL Final   • RDW 03/13/2020 13.2  12.3 - 15.4 % Final   • Platelets 03/13/2020 307  140 - 450 10*3/mm3 Final   • Neutrophil Rel % 03/13/2020 57.3  42.7 - 76.0 % Final   • Lymphocyte Rel % 03/13/2020 30.1  19.6 - 45.3 % Final   • Monocyte Rel % 03/13/2020 7.8  5.0 - 12.0 % Final   • Eosinophil Rel % 03/13/2020 3.7  0.3 - 6.2 % Final   • Basophil Rel % 03/13/2020 0.8  0.0 - 1.5 % Final   • Neutrophils Absolute 03/13/2020 5.71  1.70 - 7.00 10*3/mm3 Final   • Lymphocytes Absolute 03/13/2020 3.00  0.70 - 3.10 10*3/mm3 Final   • Monocytes Absolute 03/13/2020 0.78  0.10 - 0.90 10*3/mm3 Final   • Eosinophils Absolute 03/13/2020 0.37  0.00 - 0.40 10*3/mm3 Final   • Basophils Absolute 03/13/2020 0.08  0.00 - 0.20 10*3/mm3 Final   • Immature Granulocyte Rel % 03/13/2020 0.3  0.0 - 0.5 % Final   • Immature Grans Absolute 03/13/2020 0.03  0.00 - 0.05 10*3/mm3 Final   • nRBC 03/13/2020 0.0  0.0 - 0.2 /100 WBC Final   • Hemoglobin A1C 03/13/2020 6.10* 4.80 - 5.60 % Final    Comment: Hemoglobin A1C Ranges:  Increased Risk for Diabetes  5.7% to 6.4%  Diabetes                     >= 6.5%  Diabetic Goal                < 7.0%          Obinna was seen today for annual exam.    Diagnoses and all orders for this visit:    Annual physical exam    Benign essential HTN    Mixed hyperlipidemia    Attention deficit hyperactivity disorder (ADHD), predominantly inattentive type    PALMA on CPAP    Prediabetes  -     Hemoglobin A1c    Immunization due  -     Tdap Vaccine Greater Than or Equal To 6yo IM        Encourage healthy diet and exercise.  Encourage patient to stay up to date on screening examinations as indicated based on age and risk factors.    EMR Dragon/Transcription disclaimer:    Much of this encounter note is an electronic transcription/translation of spoken language to printed text.  The electronic translation of spoken language may permit erroneous, or at times, nonsensical words or phrases to be  inadvertently transcribed.  Although I have reviewed the note for such errors some may still exist.

## 2020-08-12 LAB — HBA1C MFR BLD: 5.9 % (ref 4.8–5.6)

## 2021-03-01 DIAGNOSIS — R73.03 PREDIABETES: Primary | ICD-10-CM

## 2021-03-03 LAB — HBA1C MFR BLD: 5.99 % (ref 4.8–5.6)

## 2021-03-09 ENCOUNTER — OFFICE VISIT (OUTPATIENT)
Dept: SPORTS MEDICINE | Facility: CLINIC | Age: 36
End: 2021-03-09

## 2021-03-09 VITALS
BODY MASS INDEX: 40.36 KG/M2 | DIASTOLIC BLOOD PRESSURE: 72 MMHG | OXYGEN SATURATION: 98 % | HEART RATE: 90 BPM | SYSTOLIC BLOOD PRESSURE: 132 MMHG | HEIGHT: 72 IN | WEIGHT: 298 LBS

## 2021-03-09 DIAGNOSIS — I10 BENIGN ESSENTIAL HTN: ICD-10-CM

## 2021-03-09 DIAGNOSIS — G56.03 BILATERAL CARPAL TUNNEL SYNDROME: ICD-10-CM

## 2021-03-09 DIAGNOSIS — E66.9 OBESITY (BMI 30-39.9): Primary | ICD-10-CM

## 2021-03-09 DIAGNOSIS — M25.50 MULTIPLE JOINT PAIN: ICD-10-CM

## 2021-03-09 DIAGNOSIS — R73.01 ABNORMAL FASTING GLUCOSE: ICD-10-CM

## 2021-03-09 PROCEDURE — 99214 OFFICE O/P EST MOD 30 MIN: CPT | Performed by: FAMILY MEDICINE

## 2021-03-09 RX ORDER — CETIRIZINE HYDROCHLORIDE 10 MG/1
TABLET ORAL
COMMUNITY
Start: 2020-04-01

## 2021-03-09 RX ORDER — DULOXETIN HYDROCHLORIDE 30 MG/1
30 CAPSULE, DELAYED RELEASE ORAL DAILY
Qty: 90 CAPSULE | Refills: 0 | Status: SHIPPED | OUTPATIENT
Start: 2021-03-09 | End: 2021-06-28

## 2021-03-09 RX ORDER — CALCIUM CARBONATE/VITAMIN D3 600 MG-10
TABLET ORAL
COMMUNITY
Start: 2021-01-06 | End: 2022-08-15

## 2021-03-09 RX ORDER — LISDEXAMFETAMINE DIMESYLATE 60 MG/1
CAPSULE ORAL
COMMUNITY
Start: 2021-02-15 | End: 2022-07-25

## 2021-03-09 NOTE — PROGRESS NOTES
"Chief Complaint  Blood Sugar Problem (wanting to follow up in regards to his A1C)    Subjective          Obinna White presents to [facility]  History of Present Illness  1, 2. Prediabetes, obesity. Has regained wt since last visit.  3. HTN. Rx as prescribed.  4. New problems, b/l hand tingling and multiple joint pain. X 3 mo. he associates some joint pain in his left knee where he had ACL reconstruction many years ago.  Has also associated joint pains in the upper extremities.  Worsening. Improves w/Tylenol, IBU QHS. Has to shake hands in AM due to tingling.  Has also noticed numbness when he is driving and has his hands on the steering well.  Also associates  weakness.    Objective   Vital Signs:   /72 (BP Location: Right arm, Patient Position: Sitting, Cuff Size: Large Adult)   Pulse 90   Ht 182.9 cm (72.01\")   Wt 135 kg (298 lb)   SpO2 98%   BMI 40.41 kg/m²     Physical Exam   General: No acute distress  Psych: Flat affect.  Depressed.  Bilateral hand: Full range of motion.  No atrophy noted.  Negative Phalen test.  Result Review :     Most Recent A1C    HGBA1C Most Recent 3/2/21   Hemoglobin A1C 5.99 (A)   (A) Abnormal value       Comments are available for some flowsheets but are not being displayed.           Lab Results   Component Value Date    GLUCOSE 91 10/15/2019    BUN 12 03/13/2020    CREATININE 0.76 03/13/2020    EGFRIFNONA 116 03/13/2020    EGFRIFAFRI 141 03/13/2020    BCR 15.8 03/13/2020    K 3.8 03/13/2020    CO2 29.7 (H) 03/13/2020    CALCIUM 9.7 03/13/2020    PROTENTOTREF 8.1 03/13/2020    ALBUMIN 4.60 03/13/2020    LABIL2 1.3 03/13/2020    AST 36 03/13/2020    ALT 55 (H) 03/13/2020                 Assessment and Plan    Diagnoses and all orders for this visit:    1. Obesity (BMI 30-39.9) (Primary)    2. Abnormal fasting glucose    3. Benign essential HTN    4. Bilateral carpal tunnel syndrome    5. Multiple joint pain    Other orders  -     " DULoxetine (Cymbalta) 30 MG capsule; Take 1 capsule by mouth Daily.  Dispense: 90 capsule; Refill: 0    Counseled on weight loss as it can help with overall health including his fasting blood glucose, hemoglobin A1c which did trend upward slightly.  Continue previous medications as written.  As for his CTS, we will try cock-up wrist splints.  Consider injections at follow-up.  As for his multiple joint pain with depression, trial of Cymbalta.  Follow-up in 4 weeks for reevaluation of efficacy of Cymbalta.    Follow Up   Return in about 4 weeks (around 4/6/2021) for Recheck.  Patient was given instructions and counseling regarding his condition or for health maintenance advice. Please see specific information pulled into the AVS if appropriate.

## 2021-03-29 DIAGNOSIS — I10 ESSENTIAL HYPERTENSION: ICD-10-CM

## 2021-03-29 RX ORDER — METOPROLOL SUCCINATE 50 MG/1
TABLET, EXTENDED RELEASE ORAL
Qty: 90 TABLET | Refills: 3 | Status: SHIPPED | OUTPATIENT
Start: 2021-03-29 | End: 2022-03-01

## 2021-05-06 RX ORDER — CHLORTHALIDONE 25 MG/1
TABLET ORAL
Qty: 90 TABLET | Refills: 3 | Status: SHIPPED | OUTPATIENT
Start: 2021-05-06 | End: 2022-05-16

## 2021-06-28 ENCOUNTER — OFFICE VISIT (OUTPATIENT)
Dept: SPORTS MEDICINE | Facility: CLINIC | Age: 36
End: 2021-06-28

## 2021-06-28 VITALS
TEMPERATURE: 97.6 F | SYSTOLIC BLOOD PRESSURE: 138 MMHG | RESPIRATION RATE: 18 BRPM | HEIGHT: 72 IN | OXYGEN SATURATION: 98 % | HEART RATE: 87 BPM | WEIGHT: 292 LBS | BODY MASS INDEX: 39.55 KG/M2 | DIASTOLIC BLOOD PRESSURE: 80 MMHG

## 2021-06-28 DIAGNOSIS — E66.9 OBESITY (BMI 30-39.9): ICD-10-CM

## 2021-06-28 DIAGNOSIS — I10 BENIGN ESSENTIAL HTN: Primary | ICD-10-CM

## 2021-06-28 DIAGNOSIS — F90.0 ATTENTION DEFICIT HYPERACTIVITY DISORDER (ADHD), PREDOMINANTLY INATTENTIVE TYPE: ICD-10-CM

## 2021-06-28 PROCEDURE — 99212 OFFICE O/P EST SF 10 MIN: CPT | Performed by: FAMILY MEDICINE

## 2021-06-28 RX ORDER — GUANFACINE 2 MG/1
2 TABLET, EXTENDED RELEASE ORAL DAILY
COMMUNITY
End: 2021-08-12

## 2021-06-28 NOTE — PROGRESS NOTES
"Chief Complaint  Hypertension (f/u for BP check - started new meds with his psychiatrist - wants to check for possible interactions )    Subjective          Obinna White presents to [facility]  History of Present Illness  Psych started guanfacine, now up to 2 mg. Has helped w/ADHD sxs. Here for BP check to see if this interacts w/metoprolol, chlorthalidone. BP checks at home similar to today's readings. Psych also rec to d/c Cymbalta - not efficacious.    Objective   Vital Signs:   /80 (BP Location: Right arm, Patient Position: Sitting, Cuff Size: Adult)   Pulse 87   Temp 97.6 °F (36.4 °C) (Temporal)   Resp 18   Ht 182.9 cm (72.01\")   Wt 132 kg (292 lb)   SpO2 98%   BMI 39.59 kg/m²     Physical Exam   General: No acute distress    Result Review :                 Assessment and Plan    Diagnoses and all orders for this visit:    1. Benign essential HTN (Primary)    2. Attention deficit hyperactivity disorder (ADHD), predominantly inattentive type    3. Obesity (BMI 30-39.9)    Reviewed drug drug interactions.  No interactions noted.  Did review possible adverse effects of new medication as well as any monitoring parameters.  No blood work indicated at this time.  He will continue with psych as recommended.  To continue checking BP at home and if any discrepancies, to contact me.      Follow Up   No follow-ups on file.  Patient was given instructions and counseling regarding his condition or for health maintenance advice. Please see specific information pulled into the AVS if appropriate.       "

## 2021-08-12 ENCOUNTER — OFFICE VISIT (OUTPATIENT)
Dept: SPORTS MEDICINE | Facility: CLINIC | Age: 36
End: 2021-08-12

## 2021-08-12 VITALS
BODY MASS INDEX: 39.55 KG/M2 | SYSTOLIC BLOOD PRESSURE: 132 MMHG | HEART RATE: 100 BPM | HEIGHT: 72 IN | TEMPERATURE: 98 F | OXYGEN SATURATION: 97 % | DIASTOLIC BLOOD PRESSURE: 80 MMHG | WEIGHT: 292 LBS

## 2021-08-12 DIAGNOSIS — Z00.00 ANNUAL PHYSICAL EXAM: Primary | ICD-10-CM

## 2021-08-12 DIAGNOSIS — E66.9 OBESITY (BMI 30-39.9): Chronic | ICD-10-CM

## 2021-08-12 DIAGNOSIS — E78.2 MIXED HYPERLIPIDEMIA: Chronic | ICD-10-CM

## 2021-08-12 DIAGNOSIS — I10 BENIGN ESSENTIAL HTN: Chronic | ICD-10-CM

## 2021-08-12 PROBLEM — Z99.89 OSA ON CPAP: Chronic | Status: ACTIVE | Noted: 2020-03-16

## 2021-08-12 PROBLEM — G47.34 SLEEP RELATED HYPOXIA: Status: RESOLVED | Noted: 2020-03-16 | Resolved: 2021-08-12

## 2021-08-12 PROBLEM — G47.33 OSA ON CPAP: Chronic | Status: ACTIVE | Noted: 2020-03-16

## 2021-08-12 PROCEDURE — 99395 PREV VISIT EST AGE 18-39: CPT | Performed by: FAMILY MEDICINE

## 2021-08-12 RX ORDER — GUANFACINE 3 MG/1
TABLET, EXTENDED RELEASE ORAL
COMMUNITY
Start: 2021-08-09 | End: 2022-08-15

## 2021-08-12 RX ORDER — LANOLIN ALCOHOL/MO/W.PET/CERES
CREAM (GRAM) TOPICAL
COMMUNITY
Start: 2021-07-15

## 2021-08-12 NOTE — PROGRESS NOTES
"Obinna White is here today for an annual physical exam.     Eating a healthy diet. Exercising routinely.     HTN: Rx at night.  Mood d/o: doing well w/guanfacine but appt later today, may increase dose.  Took Vyvanse approx 1 hr ago.    I have reviewed the patient's medical, family, and social history in detail and updated the computerized patient record.    Health Maintenance   Topic Date Due   • HEPATITIS C SCREENING  Never done   • LIPID PANEL  03/13/2021   • ANNUAL PHYSICAL  08/12/2021   • INFLUENZA VACCINE  10/01/2021   • TDAP/TD VACCINES (3 - Td or Tdap) 08/11/2030   • COVID-19 Vaccine  Completed   • Pneumococcal Vaccine 0-64  Aged Out       PHQ-2 Depression Screening  Little interest or pleasure in doing things? 2   Feeling down, depressed, or hopeless? 2   PHQ-2 Total Score 9     /80   Pulse 100   Temp 98 °F (36.7 °C)   Ht 182.9 cm (72\")   Wt 132 kg (292 lb)   SpO2 97%   BMI 39.60 kg/m²      Physical Exam    Mask worn throughout encounter  Vital signs reviewed.  General appearance: No acute distress  Eyes: conjunctiva clear without erythema; pupils equally round and reactive  ENT: external ears and nose normal; hearing normal   Neck: supple; no thyromegaly  CV: normal rate and rhythm; no peripheral edema  Respiratory: normal respiratory effort; lungs clear to auscultation bilaterally  GI: Bowel sounds x4, soft, nontender  MSK: normal gait and station; no focal joint deformity or swelling  Skin: no rash or wounds; normal turgor  Neuro: cranial nerves 2-12 grossly intact; normal sensation to light touch  Psych: mood and affect normal; recent and remote memory intact    No visits with results within 2 Week(s) from this visit.   Latest known visit with results is:   Orders Only on 03/01/2021   Component Date Value Ref Range Status   • Hemoglobin A1C 03/02/2021 5.99* 4.80 - 5.60 % Final    Comment: Hemoglobin A1C Ranges:  Increased Risk for Diabetes  5.7% to 6.4%  Diabetes                     >= " 6.5%  Diabetic Goal                < 7.0%          Diagnoses and all orders for this visit:    1. Annual physical exam (Primary)  -     CBC & Differential  -     Comprehensive Metabolic Panel  -     Hepatitis C Antibody  -     Lipid Panel  -     Hemoglobin A1c    2. Obesity (BMI 30-39.9)  -     Hemoglobin A1c    3. Mixed hyperlipidemia  -     Comprehensive Metabolic Panel  -     Lipid Panel    4. Benign essential HTN        Encourage healthy diet and exercise.  Encourage patient to stay up to date on screening examinations as indicated based on age and risk factors.    EMR Dragon/Transcription disclaimer:    Much of this encounter note is an electronic transcription/translation of spoken language to printed text.  The electronic translation of spoken language may permit erroneous, or at times, nonsensical words or phrases to be inadvertently transcribed.  Although I have reviewed the note for such errors some may still exist.

## 2021-08-13 LAB
ALBUMIN SERPL-MCNC: 4.7 G/DL (ref 3.5–5.2)
ALBUMIN/GLOB SERPL: 1.5 G/DL
ALP SERPL-CCNC: 65 U/L (ref 39–117)
ALT SERPL-CCNC: 38 U/L (ref 1–41)
AST SERPL-CCNC: 26 U/L (ref 1–40)
BASOPHILS # BLD AUTO: 0.08 10*3/MM3 (ref 0–0.2)
BASOPHILS NFR BLD AUTO: 0.9 % (ref 0–1.5)
BILIRUB SERPL-MCNC: 0.3 MG/DL (ref 0–1.2)
BUN SERPL-MCNC: 12 MG/DL (ref 6–20)
BUN/CREAT SERPL: 12.6 (ref 7–25)
CALCIUM SERPL-MCNC: 10 MG/DL (ref 8.6–10.5)
CHLORIDE SERPL-SCNC: 96 MMOL/L (ref 98–107)
CHOLEST SERPL-MCNC: 210 MG/DL (ref 0–200)
CO2 SERPL-SCNC: 31.3 MMOL/L (ref 22–29)
CREAT SERPL-MCNC: 0.95 MG/DL (ref 0.76–1.27)
EOSINOPHIL # BLD AUTO: 0.39 10*3/MM3 (ref 0–0.4)
EOSINOPHIL NFR BLD AUTO: 4.3 % (ref 0.3–6.2)
ERYTHROCYTE [DISTWIDTH] IN BLOOD BY AUTOMATED COUNT: 12.9 % (ref 12.3–15.4)
GLOBULIN SER CALC-MCNC: 3.2 GM/DL
GLUCOSE SERPL-MCNC: 96 MG/DL (ref 65–99)
HBA1C MFR BLD: 6.2 % (ref 4.8–5.6)
HCT VFR BLD AUTO: 44 % (ref 37.5–51)
HCV AB S/CO SERPL IA: <0.1 S/CO RATIO (ref 0–0.9)
HDLC SERPL-MCNC: 34 MG/DL (ref 40–60)
HGB BLD-MCNC: 15.1 G/DL (ref 13–17.7)
IMM GRANULOCYTES # BLD AUTO: 0.01 10*3/MM3 (ref 0–0.05)
IMM GRANULOCYTES NFR BLD AUTO: 0.1 % (ref 0–0.5)
LDLC SERPL CALC-MCNC: 133 MG/DL (ref 0–100)
LYMPHOCYTES # BLD AUTO: 3.22 10*3/MM3 (ref 0.7–3.1)
LYMPHOCYTES NFR BLD AUTO: 35.3 % (ref 19.6–45.3)
MCH RBC QN AUTO: 30.6 PG (ref 26.6–33)
MCHC RBC AUTO-ENTMCNC: 34.3 G/DL (ref 31.5–35.7)
MCV RBC AUTO: 89.1 FL (ref 79–97)
MONOCYTES # BLD AUTO: 0.77 10*3/MM3 (ref 0.1–0.9)
MONOCYTES NFR BLD AUTO: 8.4 % (ref 5–12)
NEUTROPHILS # BLD AUTO: 4.66 10*3/MM3 (ref 1.7–7)
NEUTROPHILS NFR BLD AUTO: 51 % (ref 42.7–76)
NRBC BLD AUTO-RTO: 0 /100 WBC (ref 0–0.2)
PLATELET # BLD AUTO: 295 10*3/MM3 (ref 140–450)
POTASSIUM SERPL-SCNC: 4.2 MMOL/L (ref 3.5–5.2)
PROT SERPL-MCNC: 7.9 G/DL (ref 6–8.5)
RBC # BLD AUTO: 4.94 10*6/MM3 (ref 4.14–5.8)
SODIUM SERPL-SCNC: 139 MMOL/L (ref 136–145)
TRIGL SERPL-MCNC: 241 MG/DL (ref 0–150)
VLDLC SERPL CALC-MCNC: 43 MG/DL (ref 5–40)
WBC # BLD AUTO: 9.13 10*3/MM3 (ref 3.4–10.8)

## 2022-03-01 DIAGNOSIS — I10 ESSENTIAL HYPERTENSION: ICD-10-CM

## 2022-03-01 RX ORDER — METOPROLOL SUCCINATE 50 MG/1
TABLET, EXTENDED RELEASE ORAL
Qty: 90 TABLET | Refills: 3 | Status: SHIPPED | OUTPATIENT
Start: 2022-03-01 | End: 2022-12-22

## 2022-05-16 RX ORDER — CHLORTHALIDONE 25 MG/1
TABLET ORAL
Qty: 90 TABLET | Refills: 1 | Status: SHIPPED | OUTPATIENT
Start: 2022-05-16 | End: 2022-12-22

## 2022-07-25 ENCOUNTER — OFFICE VISIT (OUTPATIENT)
Dept: SPORTS MEDICINE | Facility: CLINIC | Age: 37
End: 2022-07-25

## 2022-07-25 VITALS
WEIGHT: 273 LBS | HEART RATE: 86 BPM | OXYGEN SATURATION: 98 % | HEIGHT: 72 IN | SYSTOLIC BLOOD PRESSURE: 128 MMHG | DIASTOLIC BLOOD PRESSURE: 80 MMHG | BODY MASS INDEX: 36.98 KG/M2 | RESPIRATION RATE: 16 BRPM | TEMPERATURE: 97.8 F

## 2022-07-25 DIAGNOSIS — S86.112A RUPTURE OF MEDIAL HEAD OF GASTROCNEMIUS, LEFT, INITIAL ENCOUNTER: Primary | ICD-10-CM

## 2022-07-25 PROCEDURE — 99212 OFFICE O/P EST SF 10 MIN: CPT | Performed by: FAMILY MEDICINE

## 2022-07-25 RX ORDER — DEXTROAMPHETAMINE SACCHARATE, AMPHETAMINE ASPARTATE MONOHYDRATE, DEXTROAMPHETAMINE SULFATE AND AMPHETAMINE SULFATE 7.5; 7.5; 7.5; 7.5 MG/1; MG/1; MG/1; MG/1
CAPSULE, EXTENDED RELEASE ORAL
COMMUNITY
Start: 2022-07-05

## 2022-07-25 NOTE — PROGRESS NOTES
"Chief Complaint  Leg Pain (Self referral eval for LT leg pain located at the lower calf - s/p injury while moving into his new how, stepped awkwardly, felt a snap, had weakness after - DOI 07/17/2022 - no other sxs reported besides pain with extension/flexion - here today for further evaluation and treatment )    Subjective        Obinna White presents to [facility]  History of Present Illness  8 days ago patient was moving into a new home, he was unloading his truck as a step with his left leg he felt as if something had hit him in the back of the leg.  He looked around there was nothing there then he noted pain when trying to walk.  He has not noted any swelling and he has not appreciated any ecchymosis.  It has gotten somewhat better but he still has pain over the medial mid calf especially with dorsiflexion plantarflexion of the ankle.  No paresthesias.  Objective   Vital Signs:  /80 (BP Location: Left arm, Patient Position: Sitting, Cuff Size: Adult)   Pulse 86   Temp 97.8 °F (36.6 °C) (Temporal)   Resp 16   Ht 182.9 cm (72\")   Wt 124 kg (273 lb)   SpO2 98%   BMI 37.03 kg/m²   Estimated body mass index is 37.03 kg/m² as calculated from the following:    Height as of this encounter: 182.9 cm (72\").    Weight as of this encounter: 124 kg (273 lb).          Physical Exam  Vitals reviewed.   Constitutional:       Appearance: He is well-developed.   HENT:      Head: Normocephalic and atraumatic.   Eyes:      Conjunctiva/sclera: Conjunctivae normal.      Pupils: Pupils are equal, round, and reactive to light.   Cardiovascular:      Comments: No peripheral edema  Pulmonary:      Effort: Pulmonary effort is normal.   Musculoskeletal:      Comments: Left calf there is some evidence of very mild discoloration of possible ecchymosis midpoint of the medial gastroc and somewhat distally.  Patient has a normal Achilles response to calf squeeze.  There is no palpable defects " noted in the Achilles.  And palpation of the lateral gastroc there is no pain.  Over the medial gastroc he does have an area of pain in the mid belly but no palpable defect.  Motor function normal except some limitation due to pain.   Skin:     General: Skin is warm and dry.   Neurological:      Mental Status: He is alert and oriented to person, place, and time.   Psychiatric:         Behavior: Behavior normal.        Result Review :                Assessment and Plan   Diagnoses and all orders for this visit:    1. Rupture of medial head of gastrocnemius, left, initial encounter (Primary)    Discussed with the patient pathophysiology and treatment of tennis leg, I do think because of the ecchymosis although mild that he may have had of a small tear of the medial gastroc.  We will recommend compression sleeve and have given him a set of home exercises.  I would expect things would feel pretty much back to normal over the next 4 to 5 weeks.  Return if the injury does not follow this pattern or sooner if needed.         Follow Up   No follow-ups on file.  Patient was given instructions and counseling regarding his condition or for health maintenance advice. Please see specific information pulled into the AVS if appropriate.

## 2022-08-15 ENCOUNTER — OFFICE VISIT (OUTPATIENT)
Dept: SPORTS MEDICINE | Facility: CLINIC | Age: 37
End: 2022-08-15

## 2022-08-15 VITALS
SYSTOLIC BLOOD PRESSURE: 130 MMHG | WEIGHT: 276 LBS | DIASTOLIC BLOOD PRESSURE: 60 MMHG | TEMPERATURE: 97.7 F | BODY MASS INDEX: 37.38 KG/M2 | HEART RATE: 89 BPM | OXYGEN SATURATION: 98 % | RESPIRATION RATE: 16 BRPM | HEIGHT: 72 IN

## 2022-08-15 DIAGNOSIS — I10 BENIGN ESSENTIAL HTN: Chronic | ICD-10-CM

## 2022-08-15 DIAGNOSIS — E78.2 MIXED HYPERLIPIDEMIA: Chronic | ICD-10-CM

## 2022-08-15 DIAGNOSIS — Z00.00 ANNUAL PHYSICAL EXAM: Primary | ICD-10-CM

## 2022-08-15 PROCEDURE — 99395 PREV VISIT EST AGE 18-39: CPT | Performed by: FAMILY MEDICINE

## 2022-08-15 NOTE — PROGRESS NOTES
"Obinna White is here today for an annual physical exam.     Eating a healthy diet. Exercising routinely.     L calf strain improving. Returned to hiking.  Has lost some wt - 30# since Oct '21. Physically no other c/o.  ADHD, mood d/o: Dr. Kemp. Went thru divorce in past yr. Still going to counseling. Has 14 yo biological son, sees 1/2 time.  Hypretrigs: familial. Wt loss has helped.     I have reviewed the patient's medical, family, and social history in detail and updated the computerized patient record.    Health Maintenance   Topic Date Due   • ANNUAL PHYSICAL  08/13/2022   • INFLUENZA VACCINE  10/01/2022   • LIPID PANEL  08/09/2023   • TDAP/TD VACCINES (3 - Td or Tdap) 08/11/2030   • HEPATITIS C SCREENING  Completed   • COVID-19 Vaccine  Completed   • Pneumococcal Vaccine 0-64  Aged Out         /60 (BP Location: Right arm, Patient Position: Sitting, Cuff Size: Adult)   Pulse 89   Temp 97.7 °F (36.5 °C) (Temporal)   Resp 16   Ht 182.9 cm (72.01\")   Wt 125 kg (276 lb)   SpO2 98%   BMI 37.42 kg/m²      Physical Exam    Mask worn throughout encounter  Vital signs reviewed.  General appearance: No acute distress  Eyes: conjunctiva clear without erythema; pupils equally round and reactive  ENT: external ears and nose normal; hearing normal   Neck: supple; no thyromegaly  CV: normal rate and rhythm; no peripheral edema  Respiratory: normal respiratory effort; lungs clear to auscultation bilaterally  GI: Bowel sounds x4, soft, nontender  MSK: normal gait and station; no focal joint deformity or swelling  Skin: no rash or wounds; normal turgor  Neuro: cranial nerves 2-12 grossly intact; normal sensation to light touch  Psych: mood and affect normal; recent and remote memory intact    Appointment on 08/09/2022   Component Date Value Ref Range Status   • Glucose 08/09/2022 95  65 - 99 mg/dL Final   • BUN 08/09/2022 11  6 - 20 mg/dL Final   • Creatinine 08/09/2022 0.81  0.76 - 1.27 mg/dL Final   • EGFR Result " 08/09/2022 116  >59 mL/min/1.73 Final   • BUN/Creatinine Ratio 08/09/2022 14  9 - 20 Final   • Sodium 08/09/2022 137  134 - 144 mmol/L Final   • Potassium 08/09/2022 3.9  3.5 - 5.2 mmol/L Final   • Chloride 08/09/2022 94 (A) 96 - 106 mmol/L Final   • Total CO2 08/09/2022 28  20 - 29 mmol/L Final   • Calcium 08/09/2022 9.7  8.7 - 10.2 mg/dL Final   • Total Protein 08/09/2022 7.8  6.0 - 8.5 g/dL Final   • Albumin 08/09/2022 4.5  4.0 - 5.0 g/dL Final   • Globulin 08/09/2022 3.3  1.5 - 4.5 g/dL Final   • A/G Ratio 08/09/2022 1.4  1.2 - 2.2 Final   • Total Bilirubin 08/09/2022 <0.2  0.0 - 1.2 mg/dL Final   • Alkaline Phosphatase 08/09/2022 66  44 - 121 IU/L Final   • AST (SGOT) 08/09/2022 23  0 - 40 IU/L Final   • ALT (SGPT) 08/09/2022 27  0 - 44 IU/L Final   • Hemoglobin A1C 08/09/2022 6.0 (A) 4.8 - 5.6 % Final    Comment:          Prediabetes: 5.7 - 6.4           Diabetes: >6.4           Glycemic control for adults with diabetes: <7.0     • WBC 08/09/2022 10.8  3.4 - 10.8 x10E3/uL Final   • RBC 08/09/2022 4.97  4.14 - 5.80 x10E6/uL Final   • Hemoglobin 08/09/2022 14.9  13.0 - 17.7 g/dL Final   • Hematocrit 08/09/2022 44.9  37.5 - 51.0 % Final   • MCV 08/09/2022 90  79 - 97 fL Final   • MCH 08/09/2022 30.0  26.6 - 33.0 pg Final   • MCHC 08/09/2022 33.2  31.5 - 35.7 g/dL Final   • RDW 08/09/2022 13.7  11.6 - 15.4 % Final   • Platelets 08/09/2022 278  150 - 450 x10E3/uL Final   • Neutrophil Rel % 08/09/2022 48  Not Estab. % Final   • Lymphocyte Rel % 08/09/2022 38  Not Estab. % Final   • Monocyte Rel % 08/09/2022 9  Not Estab. % Final   • Eosinophil Rel % 08/09/2022 4  Not Estab. % Final   • Basophil Rel % 08/09/2022 1  Not Estab. % Final   • Neutrophils Absolute 08/09/2022 5.2  1.4 - 7.0 x10E3/uL Final   • Lymphocytes Absolute 08/09/2022 4.1 (A) 0.7 - 3.1 x10E3/uL Final   • Monocytes Absolute 08/09/2022 1.0 (A) 0.1 - 0.9 x10E3/uL Final   • Eosinophils Absolute 08/09/2022 0.4  0.0 - 0.4 x10E3/uL Final   • Basophils  Absolute 08/09/2022 0.1  0.0 - 0.2 x10E3/uL Final   • Immature Granulocyte Rel % 08/09/2022 0  Not Estab. % Final   • Immature Grans Absolute 08/09/2022 0.0  0.0 - 0.1 x10E3/uL Final   • Total Cholesterol 08/09/2022 197  100 - 199 mg/dL Final   • Triglycerides 08/09/2022 308 (A) 0 - 149 mg/dL Final   • HDL Cholesterol 08/09/2022 37 (A) >39 mg/dL Final   • VLDL Cholesterol Donny 08/09/2022 53 (A) 5 - 40 mg/dL Final   • LDL Chol Calc ([facility]) 08/09/2022 107 (A) 0 - 99 mg/dL Final        Diagnoses and all orders for this visit:    1. Annual physical exam (Primary)        Encourage healthy diet and exercise.  Encourage patient to stay up to date on screening examinations as indicated based on age and risk factors.    EMR Dragon/Transcription disclaimer:    Much of this encounter note is an electronic transcription/translation of spoken language to printed text.  The electronic translation of spoken language may permit erroneous, or at times, nonsensical words or phrases to be inadvertently transcribed.  Although I have reviewed the note for such errors some may still exist.

## 2022-12-22 DIAGNOSIS — I10 ESSENTIAL HYPERTENSION: ICD-10-CM

## 2022-12-22 RX ORDER — METOPROLOL SUCCINATE 50 MG/1
TABLET, EXTENDED RELEASE ORAL
Qty: 90 TABLET | Refills: 3 | Status: SHIPPED | OUTPATIENT
Start: 2022-12-22

## 2022-12-22 RX ORDER — CHLORTHALIDONE 25 MG/1
TABLET ORAL
Qty: 90 TABLET | Refills: 3 | Status: SHIPPED | OUTPATIENT
Start: 2022-12-22

## 2023-10-17 DIAGNOSIS — Z00.00 ANNUAL PHYSICAL EXAM: Primary | ICD-10-CM

## 2023-10-17 DIAGNOSIS — E78.2 MIXED HYPERLIPIDEMIA: ICD-10-CM

## 2023-10-25 ENCOUNTER — OFFICE VISIT (OUTPATIENT)
Dept: SPORTS MEDICINE | Facility: CLINIC | Age: 38
End: 2023-10-25
Payer: COMMERCIAL

## 2023-10-25 ENCOUNTER — LAB (OUTPATIENT)
Dept: LAB | Facility: HOSPITAL | Age: 38
End: 2023-10-25
Payer: COMMERCIAL

## 2023-10-25 VITALS
BODY MASS INDEX: 38.47 KG/M2 | OXYGEN SATURATION: 99 % | HEART RATE: 90 BPM | RESPIRATION RATE: 16 BRPM | DIASTOLIC BLOOD PRESSURE: 60 MMHG | WEIGHT: 284 LBS | HEIGHT: 72 IN | SYSTOLIC BLOOD PRESSURE: 110 MMHG

## 2023-10-25 DIAGNOSIS — Z00.00 ANNUAL PHYSICAL EXAM: Primary | ICD-10-CM

## 2023-10-25 DIAGNOSIS — I10 ESSENTIAL HYPERTENSION: ICD-10-CM

## 2023-10-25 LAB
ALBUMIN SERPL-MCNC: 4.8 G/DL (ref 3.5–5.2)
ALBUMIN/GLOB SERPL: 1.5 G/DL
ALP SERPL-CCNC: 65 U/L (ref 39–117)
ALT SERPL W P-5'-P-CCNC: 27 U/L (ref 1–41)
ANION GAP SERPL CALCULATED.3IONS-SCNC: 10.4 MMOL/L (ref 5–15)
AST SERPL-CCNC: 23 U/L (ref 1–40)
BASOPHILS # BLD AUTO: 0.09 10*3/MM3 (ref 0–0.2)
BASOPHILS NFR BLD AUTO: 0.9 % (ref 0–1.5)
BILIRUB SERPL-MCNC: 0.4 MG/DL (ref 0–1.2)
BUN SERPL-MCNC: 13 MG/DL (ref 6–20)
BUN/CREAT SERPL: 14.9 (ref 7–25)
CALCIUM SPEC-SCNC: 9.9 MG/DL (ref 8.6–10.5)
CHLORIDE SERPL-SCNC: 95 MMOL/L (ref 98–107)
CHOLEST SERPL-MCNC: 209 MG/DL (ref 0–200)
CO2 SERPL-SCNC: 29.6 MMOL/L (ref 22–29)
CREAT SERPL-MCNC: 0.87 MG/DL (ref 0.76–1.27)
DEPRECATED RDW RBC AUTO: 41.8 FL (ref 37–54)
EGFRCR SERPLBLD CKD-EPI 2021: 111.9 ML/MIN/1.73
EOSINOPHIL # BLD AUTO: 0.39 10*3/MM3 (ref 0–0.4)
EOSINOPHIL NFR BLD AUTO: 3.8 % (ref 0.3–6.2)
ERYTHROCYTE [DISTWIDTH] IN BLOOD BY AUTOMATED COUNT: 12.8 % (ref 12.3–15.4)
GLOBULIN UR ELPH-MCNC: 3.2 GM/DL
GLUCOSE SERPL-MCNC: 102 MG/DL (ref 65–99)
HBA1C MFR BLD: 6 % (ref 4.8–5.6)
HCT VFR BLD AUTO: 46.2 % (ref 37.5–51)
HDLC SERPL-MCNC: 35 MG/DL (ref 40–60)
HGB BLD-MCNC: 15.6 G/DL (ref 13–17.7)
IMM GRANULOCYTES # BLD AUTO: 0.03 10*3/MM3 (ref 0–0.05)
IMM GRANULOCYTES NFR BLD AUTO: 0.3 % (ref 0–0.5)
LDLC SERPL CALC-MCNC: 129 MG/DL (ref 0–100)
LDLC/HDLC SERPL: 3.54 {RATIO}
LYMPHOCYTES # BLD AUTO: 3.53 10*3/MM3 (ref 0.7–3.1)
LYMPHOCYTES NFR BLD AUTO: 34.6 % (ref 19.6–45.3)
MCH RBC QN AUTO: 30.1 PG (ref 26.6–33)
MCHC RBC AUTO-ENTMCNC: 33.8 G/DL (ref 31.5–35.7)
MCV RBC AUTO: 89.2 FL (ref 79–97)
MONOCYTES # BLD AUTO: 0.87 10*3/MM3 (ref 0.1–0.9)
MONOCYTES NFR BLD AUTO: 8.5 % (ref 5–12)
NEUTROPHILS NFR BLD AUTO: 5.28 10*3/MM3 (ref 1.7–7)
NEUTROPHILS NFR BLD AUTO: 51.9 % (ref 42.7–76)
NRBC BLD AUTO-RTO: 0 /100 WBC (ref 0–0.2)
PLATELET # BLD AUTO: 277 10*3/MM3 (ref 140–450)
PMV BLD AUTO: 10.6 FL (ref 6–12)
POTASSIUM SERPL-SCNC: 3.7 MMOL/L (ref 3.5–5.2)
PROT SERPL-MCNC: 8 G/DL (ref 6–8.5)
RBC # BLD AUTO: 5.18 10*6/MM3 (ref 4.14–5.8)
SODIUM SERPL-SCNC: 135 MMOL/L (ref 136–145)
TRIGL SERPL-MCNC: 251 MG/DL (ref 0–150)
VLDLC SERPL-MCNC: 45 MG/DL (ref 5–40)
WBC NRBC COR # BLD: 10.19 10*3/MM3 (ref 3.4–10.8)

## 2023-10-25 PROCEDURE — 80061 LIPID PANEL: CPT | Performed by: FAMILY MEDICINE

## 2023-10-25 PROCEDURE — 80053 COMPREHEN METABOLIC PANEL: CPT | Performed by: FAMILY MEDICINE

## 2023-10-25 PROCEDURE — 83036 HEMOGLOBIN GLYCOSYLATED A1C: CPT | Performed by: FAMILY MEDICINE

## 2023-10-25 PROCEDURE — 99396 PREV VISIT EST AGE 40-64: CPT | Performed by: FAMILY MEDICINE

## 2023-10-25 PROCEDURE — 85025 COMPLETE CBC W/AUTO DIFF WBC: CPT | Performed by: FAMILY MEDICINE

## 2023-10-25 RX ORDER — CHLORTHALIDONE 25 MG/1
25 TABLET ORAL DAILY
Qty: 90 TABLET | Refills: 3 | Status: SHIPPED | OUTPATIENT
Start: 2023-10-25

## 2023-10-25 RX ORDER — METOPROLOL SUCCINATE 50 MG/1
50 TABLET, EXTENDED RELEASE ORAL DAILY
Qty: 90 TABLET | Refills: 3 | Status: SHIPPED | OUTPATIENT
Start: 2023-10-25

## 2023-10-25 RX ORDER — LISDEXAMFETAMINE DIMESYLATE CAPSULES 60 MG/1
60 CAPSULE ORAL EVERY MORNING
COMMUNITY

## 2023-10-25 NOTE — PROGRESS NOTES
"Obinna White presents for an annual physical exam.     HTN: Rx as prescribed.  ADHD: switched to Vyvanse thru psych. Still seeing counseling re:divorce.  Received flu vax previously.    I have reviewed the patient's medical, family, and social history in detail and updated the computerized patient record.    Health Maintenance   Topic Date Due    BMI FOLLOWUP  Never done    LIPID PANEL  08/09/2023    COVID-19 Vaccine (5 - 2023-24 season) 09/01/2023    ANNUAL PHYSICAL  10/25/2024    TDAP/TD VACCINES (3 - Td or Tdap) 08/11/2030    HEPATITIS C SCREENING  Completed    INFLUENZA VACCINE  Completed    Pneumococcal Vaccine 0-64  Aged Out         /60 (BP Location: Left arm, Patient Position: Sitting, Cuff Size: Large Adult)   Pulse 90   Resp 16   Ht 182.9 cm (72.01\")   Wt 129 kg (284 lb)   SpO2 99%   BMI 38.51 kg/m²      Physical Exam    Vital signs reviewed.  General appearance: No acute distress  Eyes: conjunctiva clear without erythema; pupils equally round and reactive  ENT: external ears and nose normal; hearing normal   Neck: supple; no thyromegaly  CV: normal rate and rhythm; no peripheral edema  Respiratory: normal respiratory effort; lungs clear to auscultation bilaterally  MSK: normal gait and station; no focal joint deformity or swelling  Skin: no rash or wounds; normal turgor  Neuro: cranial nerves 2-12 grossly intact; normal sensation to light touch  Psych: mood and affect normal; recent and remote memory intact    No visits with results within 2 Week(s) from this visit.   Latest known visit with results is:   Appointment on 08/09/2022   Component Date Value Ref Range Status    Glucose 08/09/2022 95  65 - 99 mg/dL Final    BUN 08/09/2022 11  6 - 20 mg/dL Final    Creatinine 08/09/2022 0.81  0.76 - 1.27 mg/dL Final    EGFR Result 08/09/2022 116  >59 mL/min/1.73 Final    BUN/Creatinine Ratio 08/09/2022 14  9 - 20 Final    Sodium 08/09/2022 137  134 - 144 mmol/L Final    Potassium 08/09/2022 3.9  3.5 " - 5.2 mmol/L Final    Chloride 08/09/2022 94 (L)  96 - 106 mmol/L Final    Total CO2 08/09/2022 28  20 - 29 mmol/L Final    Calcium 08/09/2022 9.7  8.7 - 10.2 mg/dL Final    Total Protein 08/09/2022 7.8  6.0 - 8.5 g/dL Final    Albumin 08/09/2022 4.5  4.0 - 5.0 g/dL Final    Globulin 08/09/2022 3.3  1.5 - 4.5 g/dL Final    A/G Ratio 08/09/2022 1.4  1.2 - 2.2 Final    Total Bilirubin 08/09/2022 <0.2  0.0 - 1.2 mg/dL Final    Alkaline Phosphatase 08/09/2022 66  44 - 121 IU/L Final    AST (SGOT) 08/09/2022 23  0 - 40 IU/L Final    ALT (SGPT) 08/09/2022 27  0 - 44 IU/L Final    Hemoglobin A1C 08/09/2022 6.0 (H)  4.8 - 5.6 % Final    Comment:          Prediabetes: 5.7 - 6.4           Diabetes: >6.4           Glycemic control for adults with diabetes: <7.0      WBC 08/09/2022 10.8  3.4 - 10.8 x10E3/uL Final    RBC 08/09/2022 4.97  4.14 - 5.80 x10E6/uL Final    Hemoglobin 08/09/2022 14.9  13.0 - 17.7 g/dL Final    Hematocrit 08/09/2022 44.9  37.5 - 51.0 % Final    MCV 08/09/2022 90  79 - 97 fL Final    MCH 08/09/2022 30.0  26.6 - 33.0 pg Final    MCHC 08/09/2022 33.2  31.5 - 35.7 g/dL Final    RDW 08/09/2022 13.7  11.6 - 15.4 % Final    Platelets 08/09/2022 278  150 - 450 x10E3/uL Final    Neutrophil Rel % 08/09/2022 48  Not Estab. % Final    Lymphocyte Rel % 08/09/2022 38  Not Estab. % Final    Monocyte Rel % 08/09/2022 9  Not Estab. % Final    Eosinophil Rel % 08/09/2022 4  Not Estab. % Final    Basophil Rel % 08/09/2022 1  Not Estab. % Final    Neutrophils Absolute 08/09/2022 5.2  1.4 - 7.0 x10E3/uL Final    Lymphocytes Absolute 08/09/2022 4.1 (H)  0.7 - 3.1 x10E3/uL Final    Monocytes Absolute 08/09/2022 1.0 (H)  0.1 - 0.9 x10E3/uL Final    Eosinophils Absolute 08/09/2022 0.4  0.0 - 0.4 x10E3/uL Final    Basophils Absolute 08/09/2022 0.1  0.0 - 0.2 x10E3/uL Final    Immature Granulocyte Rel % 08/09/2022 0  Not Estab. % Final    Immature Grans Absolute 08/09/2022 0.0  0.0 - 0.1 x10E3/uL Final    Total Cholesterol  08/09/2022 197  100 - 199 mg/dL Final    Triglycerides 08/09/2022 308 (H)  0 - 149 mg/dL Final    HDL Cholesterol 08/09/2022 37 (L)  >39 mg/dL Final    VLDL Cholesterol Donny 08/09/2022 53 (H)  5 - 40 mg/dL Final    LDL Chol Calc ([facility]) 08/09/2022 107 (H)  0 - 99 mg/dL Final        Diagnoses and all orders for this visit:    1. Annual physical exam (Primary)    2. Essential hypertension  -     chlorthalidone (HYGROTON) 25 MG tablet; Take 1 tablet by mouth Daily.  Dispense: 90 tablet; Refill: 3  -     metoprolol succinate XL (TOPROL-XL) 50 MG 24 hr tablet; Take 1 tablet by mouth Daily.  Dispense: 90 tablet; Refill: 3        Encourage healthy diet and exercise.  Encourage patient to stay up to date on screening examinations as indicated based on age and risk factors.

## 2023-11-27 ENCOUNTER — PATIENT MESSAGE (OUTPATIENT)
Dept: SPORTS MEDICINE | Facility: CLINIC | Age: 38
End: 2023-11-27
Payer: COMMERCIAL

## 2024-09-05 ENCOUNTER — TELEPHONE (OUTPATIENT)
Dept: SPORTS MEDICINE | Facility: CLINIC | Age: 39
End: 2024-09-05
Payer: COMMERCIAL

## 2024-09-05 DIAGNOSIS — I10 ESSENTIAL HYPERTENSION: ICD-10-CM

## 2024-09-05 DIAGNOSIS — E78.2 MIXED HYPERLIPIDEMIA: ICD-10-CM

## 2024-09-05 DIAGNOSIS — Z00.00 ANNUAL PHYSICAL EXAM: Primary | ICD-10-CM

## 2024-09-05 NOTE — TELEPHONE ENCOUNTER
Patient is requesting lab orders be placed.   He has a CPE scheduled for 10-28-24    Please advise.

## 2024-10-23 ENCOUNTER — LAB (OUTPATIENT)
Dept: LAB | Facility: HOSPITAL | Age: 39
End: 2024-10-23
Payer: COMMERCIAL

## 2024-10-23 ENCOUNTER — OFFICE VISIT (OUTPATIENT)
Dept: SPORTS MEDICINE | Facility: CLINIC | Age: 39
End: 2024-10-23
Payer: COMMERCIAL

## 2024-10-23 VITALS
SYSTOLIC BLOOD PRESSURE: 120 MMHG | HEART RATE: 77 BPM | HEIGHT: 72 IN | OXYGEN SATURATION: 97 % | RESPIRATION RATE: 16 BRPM | DIASTOLIC BLOOD PRESSURE: 80 MMHG | WEIGHT: 284 LBS | BODY MASS INDEX: 38.47 KG/M2

## 2024-10-23 DIAGNOSIS — M25.562 ACUTE PAIN OF LEFT KNEE: Primary | ICD-10-CM

## 2024-10-23 DIAGNOSIS — M23.92 INTERNAL DERANGEMENT OF LEFT KNEE: ICD-10-CM

## 2024-10-23 DIAGNOSIS — Z98.890 HISTORY OF REPAIR OF ACL: ICD-10-CM

## 2024-10-23 LAB
ALBUMIN SERPL-MCNC: 4.3 G/DL (ref 3.5–5.2)
ALBUMIN/GLOB SERPL: 1.1 G/DL
ALP SERPL-CCNC: 67 U/L (ref 39–117)
ALT SERPL W P-5'-P-CCNC: 39 U/L (ref 1–41)
ANION GAP SERPL CALCULATED.3IONS-SCNC: 8.8 MMOL/L (ref 5–15)
AST SERPL-CCNC: 31 U/L (ref 1–40)
BASOPHILS # BLD AUTO: 0.06 10*3/MM3 (ref 0–0.2)
BASOPHILS NFR BLD AUTO: 0.6 % (ref 0–1.5)
BILIRUB SERPL-MCNC: 0.6 MG/DL (ref 0–1.2)
BUN SERPL-MCNC: 19 MG/DL (ref 6–20)
BUN/CREAT SERPL: 19 (ref 7–25)
CALCIUM SPEC-SCNC: 9.8 MG/DL (ref 8.6–10.5)
CHLORIDE SERPL-SCNC: 97 MMOL/L (ref 98–107)
CHOLEST SERPL-MCNC: 224 MG/DL (ref 0–200)
CO2 SERPL-SCNC: 32.2 MMOL/L (ref 22–29)
CREAT SERPL-MCNC: 1 MG/DL (ref 0.76–1.27)
DEPRECATED RDW RBC AUTO: 41.3 FL (ref 37–54)
EGFRCR SERPLBLD CKD-EPI 2021: 97 ML/MIN/1.73
EOSINOPHIL # BLD AUTO: 0.4 10*3/MM3 (ref 0–0.4)
EOSINOPHIL NFR BLD AUTO: 4.2 % (ref 0.3–6.2)
ERYTHROCYTE [DISTWIDTH] IN BLOOD BY AUTOMATED COUNT: 12.6 % (ref 12.3–15.4)
GLOBULIN UR ELPH-MCNC: 4 GM/DL
GLUCOSE SERPL-MCNC: 102 MG/DL (ref 65–99)
HBA1C MFR BLD: 6.2 % (ref 4.8–5.6)
HCT VFR BLD AUTO: 44.2 % (ref 37.5–51)
HDLC SERPL-MCNC: 30 MG/DL (ref 40–60)
HGB BLD-MCNC: 15 G/DL (ref 13–17.7)
IMM GRANULOCYTES # BLD AUTO: 0.02 10*3/MM3 (ref 0–0.05)
IMM GRANULOCYTES NFR BLD AUTO: 0.2 % (ref 0–0.5)
LDLC SERPL CALC-MCNC: 154 MG/DL (ref 0–100)
LDLC/HDLC SERPL: 5.04 {RATIO}
LYMPHOCYTES # BLD AUTO: 3.13 10*3/MM3 (ref 0.7–3.1)
LYMPHOCYTES NFR BLD AUTO: 32.8 % (ref 19.6–45.3)
MCH RBC QN AUTO: 30.1 PG (ref 26.6–33)
MCHC RBC AUTO-ENTMCNC: 33.9 G/DL (ref 31.5–35.7)
MCV RBC AUTO: 88.8 FL (ref 79–97)
MONOCYTES # BLD AUTO: 0.88 10*3/MM3 (ref 0.1–0.9)
MONOCYTES NFR BLD AUTO: 9.2 % (ref 5–12)
NEUTROPHILS NFR BLD AUTO: 5.05 10*3/MM3 (ref 1.7–7)
NEUTROPHILS NFR BLD AUTO: 53 % (ref 42.7–76)
NRBC BLD AUTO-RTO: 0 /100 WBC (ref 0–0.2)
PLATELET # BLD AUTO: 301 10*3/MM3 (ref 140–450)
PMV BLD AUTO: 9.8 FL (ref 6–12)
POTASSIUM SERPL-SCNC: 3.5 MMOL/L (ref 3.5–5.2)
PROT SERPL-MCNC: 8.3 G/DL (ref 6–8.5)
RBC # BLD AUTO: 4.98 10*6/MM3 (ref 4.14–5.8)
SODIUM SERPL-SCNC: 138 MMOL/L (ref 136–145)
TRIGL SERPL-MCNC: 214 MG/DL (ref 0–150)
VLDLC SERPL-MCNC: 40 MG/DL (ref 5–40)
WBC NRBC COR # BLD AUTO: 9.54 10*3/MM3 (ref 3.4–10.8)

## 2024-10-23 PROCEDURE — 85025 COMPLETE CBC W/AUTO DIFF WBC: CPT | Performed by: FAMILY MEDICINE

## 2024-10-23 PROCEDURE — 80061 LIPID PANEL: CPT | Performed by: FAMILY MEDICINE

## 2024-10-23 PROCEDURE — 80053 COMPREHEN METABOLIC PANEL: CPT | Performed by: FAMILY MEDICINE

## 2024-10-23 PROCEDURE — 99213 OFFICE O/P EST LOW 20 MIN: CPT | Performed by: FAMILY MEDICINE

## 2024-10-23 PROCEDURE — 83036 HEMOGLOBIN GLYCOSYLATED A1C: CPT | Performed by: FAMILY MEDICINE

## 2024-10-23 NOTE — PROGRESS NOTES
Obinna is a 39 y.o. year old male presents to [facility]    Chief Complaint   Patient presents with    Left Knee - Follow-up, Pain     F/u re-eval for LT knee pain - reports UC visit on 10/14/2024 after a fall from hiking, x-rays performed as well and he stated he went to [facility] for eval and was informed, ordered MRI which was done yesterday and still waiting for results - here for further evaluation and treatment        History of Present Illness  History of Present Illness  The patient presents for evaluation of left knee pain.    He experienced severe pain and swelling in his left knee last week, describing it as being the size of a softball. Despite this, the urgent care center he visited did not observe any swelling. The swelling has since reduced significantly, but the pain persists, particularly towards the end of the day.    He reports instability in his knee, feeling as though it wants to push out laterally, causing pain that radiates down the side of his calf. This issue began after he landed awkwardly on a small log while hiking, causing his foot and knee to twist in opposite directions. He heard a popping sound and immediately felt swelling and stiffness in his knee.    He was prescribed diclofenac at the urgent care center and has been taking Tylenol and ibuprofen at home, which have provided some relief. However, he experiences a burning sensation in his knee at night, regardless of its position, which disrupts his sleep. He also reports a clicking sound in his knee, which has subsided this week but has altered his gait.    He was given a sleeve at the urgent care center, which he plans to try again this week as the swelling has reduced. He believes the hinge might provide additional support to prevent his knee from rotating out. He has noticed some improvement in his condition over the past week.    I have reviewed the patient's medical, family, and social history  "in detail and updated the computerized patient record.    /80 (BP Location: Right arm, Patient Position: Sitting, Cuff Size: Large Adult)   Pulse 77   Resp 16   Ht 182.9 cm (72.01\")   Wt 129 kg (284 lb)   SpO2 97%   BMI 38.51 kg/m²      Physical Exam    Vital signs reviewed.   General: No acute distress.  Eyes: conjunctiva clear; pupils equally round and reactive  ENT: external ears atraumatic  CV: no peripheral edema  Resp: normal respiratory effort, no use of accessory muscles  Skin: no rashes or wounds; normal turgor  Psych: mood and affect appropriate; recent and remote memory intact  Neuro: sensation to light touch intact    MSK Exam  Physical Exam  Left knee shows no effusion, full range of motion, negative medial and lateral Joycelyn. No varus or valgus laxity. There is joint line tenderness along the lateral joint line, minimally along the medial joint line.    XR Knee 1 or 2 View Left (10/14/2024 09:42)       Results           Diagnoses and all orders for this visit:    Acute pain of left knee    Internal derangement of left knee    History of repair of ACL      Assessment & Plan  1. Left knee pain.  The patient's left knee pain is likely due to instability following a recent injury where he landed awkwardly on a small log, causing his knee to twist and resulting in a popping sound. He reports significant swelling and pain initially, which has since decreased but still worsens by the end of the day. There is also a burning sensation and a feeling of the knee slipping out of place, particularly at night. Physical examination reveals no effusion, full range of motion, negative medial and lateral Joycelyn tests, no varus or valgus laxity, but joint line tenderness along the lateral joint line and minimally along the medial joint line. He was advised to continue using the hinged knee brace, especially at night, to help with stability. He should also continue taking diclofenac and Tylenol as needed " for pain management. An MRI was completed yesterday, and he should await the results to determine the next steps. If the MRI shows a meniscus tear or other significant findings, further treatment options will be considered.            Follow Up     Patient was given instructions and counseling regarding his condition or for health maintenance advice. Please see specific information pulled into the AVS if appropriate.     Patient or patient representative verbalized consent for the use of Ambient Listening during the visit with  KIERAN Heard Jr., DO for chart documentation. 10/23/2024  08:43 EDT

## 2024-10-28 ENCOUNTER — OFFICE VISIT (OUTPATIENT)
Dept: SPORTS MEDICINE | Facility: CLINIC | Age: 39
End: 2024-10-28
Payer: COMMERCIAL

## 2024-10-28 VITALS
OXYGEN SATURATION: 99 % | HEIGHT: 72 IN | WEIGHT: 294 LBS | RESPIRATION RATE: 16 BRPM | HEART RATE: 74 BPM | BODY MASS INDEX: 39.82 KG/M2 | SYSTOLIC BLOOD PRESSURE: 118 MMHG | DIASTOLIC BLOOD PRESSURE: 62 MMHG

## 2024-10-28 DIAGNOSIS — I10 ESSENTIAL HYPERTENSION: ICD-10-CM

## 2024-10-28 DIAGNOSIS — Z00.00 ANNUAL PHYSICAL EXAM: Primary | ICD-10-CM

## 2024-10-28 DIAGNOSIS — E66.9 OBESITY (BMI 30-39.9): Chronic | ICD-10-CM

## 2024-10-28 DIAGNOSIS — R73.03 PREDIABETES: ICD-10-CM

## 2024-10-28 DIAGNOSIS — M25.562 ACUTE PAIN OF LEFT KNEE: ICD-10-CM

## 2024-10-28 DIAGNOSIS — E78.2 MIXED HYPERLIPIDEMIA: ICD-10-CM

## 2024-10-28 PROCEDURE — 99396 PREV VISIT EST AGE 40-64: CPT | Performed by: FAMILY MEDICINE

## 2024-10-28 RX ORDER — METOPROLOL SUCCINATE 50 MG/1
50 TABLET, EXTENDED RELEASE ORAL DAILY
Qty: 90 TABLET | Refills: 3 | Status: SHIPPED | OUTPATIENT
Start: 2024-10-28

## 2024-10-28 RX ORDER — CHLORTHALIDONE 25 MG/1
25 TABLET ORAL DAILY
Qty: 90 TABLET | Refills: 3 | Status: SHIPPED | OUTPATIENT
Start: 2024-10-28

## 2024-10-28 RX ORDER — ROSUVASTATIN CALCIUM 10 MG/1
10 TABLET, COATED ORAL DAILY
Qty: 90 TABLET | Refills: 1 | Status: SHIPPED | OUTPATIENT
Start: 2024-10-28

## 2024-10-28 NOTE — PROGRESS NOTES
"Obinna White presents for an annual physical exam.     L knee pain, injury. Pending f/up with ortho today to discuss MRI results. Wearing  brace.  Not taking Vyvanse.  HLD, prediabetes. Family h/o. Will work on diet, exercise. Start Crestor. Consider GLP-1.    I have reviewed the patient's medical, family, and social history in detail and updated the computerized patient record.    Health Maintenance   Topic Date Due    BMI FOLLOWUP  Never done    COVID-19 Vaccine (5 - 2023-24 season) 09/01/2024    ANNUAL PHYSICAL  10/25/2024    LIPID PANEL  10/23/2025    TDAP/TD VACCINES (3 - Td or Tdap) 08/11/2030    HEPATITIS C SCREENING  Completed    INFLUENZA VACCINE  Completed    Pneumococcal Vaccine 0-64  Aged Out         /62 (BP Location: Left arm, Patient Position: Sitting, Cuff Size: Large Adult)   Pulse 74   Resp 16   Ht 182.9 cm (72.01\")   Wt 133 kg (294 lb)   SpO2 99%   BMI 39.86 kg/m²      Physical Exam    Vital signs reviewed.  General appearance: No acute distress  Eyes: conjunctiva clear without erythema; pupils equally round and reactive  ENT: external ears and nose normal; hearing normal   Neck: supple; no thyromegaly  CV: normal rate and rhythm; no peripheral edema  Respiratory: normal respiratory effort; lungs clear to auscultation bilaterally  MSK: normal gait and station; no focal joint deformity or swelling  Skin: no rash or wounds; normal turgor  Neuro: cranial nerves 2-12 grossly intact; normal sensation to light touch  Psych: mood and affect normal; recent and remote memory intact    No visits with results within 2 Week(s) from this visit.   Latest known visit with results is:   Telephone on 09/05/2024   Component Date Value Ref Range Status    Glucose 10/23/2024 102 (H)  65 - 99 mg/dL Final    BUN 10/23/2024 19  6 - 20 mg/dL Final    Creatinine 10/23/2024 1.00  0.76 - 1.27 mg/dL Final    Sodium 10/23/2024 138  136 - 145 mmol/L Final    Potassium 10/23/2024 3.5  3.5 - 5.2 mmol/L Final    " Chloride 10/23/2024 97 (L)  98 - 107 mmol/L Final    CO2 10/23/2024 32.2 (H)  22.0 - 29.0 mmol/L Final    Calcium 10/23/2024 9.8  8.6 - 10.5 mg/dL Final    Total Protein 10/23/2024 8.3  6.0 - 8.5 g/dL Final    Albumin 10/23/2024 4.3  3.5 - 5.2 g/dL Final    ALT (SGPT) 10/23/2024 39  1 - 41 U/L Final    AST (SGOT) 10/23/2024 31  1 - 40 U/L Final    Alkaline Phosphatase 10/23/2024 67  39 - 117 U/L Final    Total Bilirubin 10/23/2024 0.6  0.0 - 1.2 mg/dL Final    Globulin 10/23/2024 4.0  gm/dL Final    A/G Ratio 10/23/2024 1.1  g/dL Final    BUN/Creatinine Ratio 10/23/2024 19.0  7.0 - 25.0 Final    Anion Gap 10/23/2024 8.8  5.0 - 15.0 mmol/L Final    eGFR 10/23/2024 97.0  >60.0 mL/min/1.73 Final    Hemoglobin A1C 10/23/2024 6.20 (H)  4.80 - 5.60 % Final    Total Cholesterol 10/23/2024 224 (H)  0 - 200 mg/dL Final    Triglycerides 10/23/2024 214 (H)  0 - 150 mg/dL Final    HDL Cholesterol 10/23/2024 30 (L)  40 - 60 mg/dL Final    LDL Cholesterol  10/23/2024 154 (H)  0 - 100 mg/dL Final    VLDL Cholesterol 10/23/2024 40  5 - 40 mg/dL Final    LDL/HDL Ratio 10/23/2024 5.04   Final    WBC 10/23/2024 9.54  3.40 - 10.80 10*3/mm3 Final    RBC 10/23/2024 4.98  4.14 - 5.80 10*6/mm3 Final    Hemoglobin 10/23/2024 15.0  13.0 - 17.7 g/dL Final    Hematocrit 10/23/2024 44.2  37.5 - 51.0 % Final    MCV 10/23/2024 88.8  79.0 - 97.0 fL Final    MCH 10/23/2024 30.1  26.6 - 33.0 pg Final    MCHC 10/23/2024 33.9  31.5 - 35.7 g/dL Final    RDW 10/23/2024 12.6  12.3 - 15.4 % Final    RDW-SD 10/23/2024 41.3  37.0 - 54.0 fl Final    MPV 10/23/2024 9.8  6.0 - 12.0 fL Final    Platelets 10/23/2024 301  140 - 450 10*3/mm3 Final    Neutrophil % 10/23/2024 53.0  42.7 - 76.0 % Final    Lymphocyte % 10/23/2024 32.8  19.6 - 45.3 % Final    Monocyte % 10/23/2024 9.2  5.0 - 12.0 % Final    Eosinophil % 10/23/2024 4.2  0.3 - 6.2 % Final    Basophil % 10/23/2024 0.6  0.0 - 1.5 % Final    Immature Grans % 10/23/2024 0.2  0.0 - 0.5 % Final    Neutrophils,  Absolute 10/23/2024 5.05  1.70 - 7.00 10*3/mm3 Final    Lymphocytes, Absolute 10/23/2024 3.13 (H)  0.70 - 3.10 10*3/mm3 Final    Monocytes, Absolute 10/23/2024 0.88  0.10 - 0.90 10*3/mm3 Final    Eosinophils, Absolute 10/23/2024 0.40  0.00 - 0.40 10*3/mm3 Final    Basophils, Absolute 10/23/2024 0.06  0.00 - 0.20 10*3/mm3 Final    Immature Grans, Absolute 10/23/2024 0.02  0.00 - 0.05 10*3/mm3 Final    nRBC 10/23/2024 0.0  0.0 - 0.2 /100 WBC Final        Diagnoses and all orders for this visit:    1. Annual physical exam (Primary)    2. Essential hypertension  -     chlorthalidone (HYGROTON) 25 MG tablet; Take 1 tablet by mouth Daily.  Dispense: 90 tablet; Refill: 3  -     metoprolol succinate XL (TOPROL-XL) 50 MG 24 hr tablet; Take 1 tablet by mouth Daily.  Dispense: 90 tablet; Refill: 3    3. Acute pain of left knee    4. Mixed hyperlipidemia  -     rosuvastatin (Crestor) 10 MG tablet; Take 1 tablet by mouth Daily.  Dispense: 90 tablet; Refill: 1    5. Prediabetes    6. Obesity (BMI 30-39.9)        Encourage healthy diet and exercise.  Encourage patient to stay up to date on screening examinations as indicated based on age and risk factors.

## 2024-11-01 ENCOUNTER — TELEPHONE (OUTPATIENT)
Dept: SPORTS MEDICINE | Facility: CLINIC | Age: 39
End: 2024-11-01
Payer: COMMERCIAL

## 2024-11-01 NOTE — TELEPHONE ENCOUNTER
Patient dropped off MRI Images and disk.  I placed the information on Dr. Heard desk.    
16 y/o F pt w/ nausea, vomiting, suprapubic pain and urinary sx's. R/O UTI. Check UA, urine pregnancy and reassess.

## 2025-01-10 ENCOUNTER — PRE-ADMISSION TESTING (OUTPATIENT)
Dept: PREADMISSION TESTING | Facility: HOSPITAL | Age: 40
End: 2025-01-10
Payer: COMMERCIAL

## 2025-01-10 VITALS
SYSTOLIC BLOOD PRESSURE: 150 MMHG | WEIGHT: 294 LBS | HEART RATE: 94 BPM | HEIGHT: 74 IN | TEMPERATURE: 97.7 F | BODY MASS INDEX: 37.73 KG/M2 | RESPIRATION RATE: 20 BRPM | DIASTOLIC BLOOD PRESSURE: 89 MMHG | OXYGEN SATURATION: 98 %

## 2025-01-10 LAB
ALBUMIN SERPL-MCNC: 4.4 G/DL (ref 3.5–5.2)
ALBUMIN/GLOB SERPL: 1.2 G/DL
ALP SERPL-CCNC: 70 U/L (ref 39–117)
ALT SERPL W P-5'-P-CCNC: 33 U/L (ref 1–41)
ANION GAP SERPL CALCULATED.3IONS-SCNC: 11.3 MMOL/L (ref 5–15)
AST SERPL-CCNC: 25 U/L (ref 1–40)
BILIRUB SERPL-MCNC: 0.5 MG/DL (ref 0–1.2)
BUN SERPL-MCNC: 14 MG/DL (ref 6–20)
BUN/CREAT SERPL: 14.9 (ref 7–25)
CALCIUM SPEC-SCNC: 9.6 MG/DL (ref 8.6–10.5)
CHLORIDE SERPL-SCNC: 97 MMOL/L (ref 98–107)
CO2 SERPL-SCNC: 27.7 MMOL/L (ref 22–29)
CREAT SERPL-MCNC: 0.94 MG/DL (ref 0.76–1.27)
DEPRECATED RDW RBC AUTO: 38.9 FL (ref 37–54)
EGFRCR SERPLBLD CKD-EPI 2021: 104.4 ML/MIN/1.73
ERYTHROCYTE [DISTWIDTH] IN BLOOD BY AUTOMATED COUNT: 12.2 % (ref 12.3–15.4)
GLOBULIN UR ELPH-MCNC: 3.7 GM/DL
GLUCOSE SERPL-MCNC: 106 MG/DL (ref 65–99)
HCT VFR BLD AUTO: 44 % (ref 37.5–51)
HGB BLD-MCNC: 15.4 G/DL (ref 13–17.7)
MCH RBC QN AUTO: 30.8 PG (ref 26.6–33)
MCHC RBC AUTO-ENTMCNC: 35 G/DL (ref 31.5–35.7)
MCV RBC AUTO: 88 FL (ref 79–97)
PLATELET # BLD AUTO: 319 10*3/MM3 (ref 140–450)
PMV BLD AUTO: 10.8 FL (ref 6–12)
POTASSIUM SERPL-SCNC: 3.3 MMOL/L (ref 3.5–5.2)
PROT SERPL-MCNC: 8.1 G/DL (ref 6–8.5)
QT INTERVAL: 378 MS
QTC INTERVAL: 455 MS
RBC # BLD AUTO: 5 10*6/MM3 (ref 4.14–5.8)
SODIUM SERPL-SCNC: 136 MMOL/L (ref 136–145)
WBC NRBC COR # BLD AUTO: 10.07 10*3/MM3 (ref 3.4–10.8)

## 2025-01-10 PROCEDURE — 85027 COMPLETE CBC AUTOMATED: CPT

## 2025-01-10 PROCEDURE — 93010 ELECTROCARDIOGRAM REPORT: CPT | Performed by: INTERNAL MEDICINE

## 2025-01-10 PROCEDURE — 93005 ELECTROCARDIOGRAM TRACING: CPT

## 2025-01-10 PROCEDURE — 80053 COMPREHEN METABOLIC PANEL: CPT

## 2025-01-10 PROCEDURE — 36415 COLL VENOUS BLD VENIPUNCTURE: CPT

## 2025-01-10 RX ORDER — DEXTROAMPHETAMINE SACCHARATE, AMPHETAMINE ASPARTATE MONOHYDRATE, DEXTROAMPHETAMINE SULFATE AND AMPHETAMINE SULFATE 7.5; 7.5; 7.5; 7.5 MG/1; MG/1; MG/1; MG/1
30 CAPSULE, EXTENDED RELEASE ORAL 2 TIMES DAILY
COMMUNITY

## 2025-01-10 RX ORDER — NAPROXEN SODIUM 220 MG/1
220 TABLET, FILM COATED ORAL 2 TIMES DAILY PRN
COMMUNITY

## 2025-01-10 NOTE — DISCHARGE INSTRUCTIONS
Take the following medications the morning of surgery:    NONE    If you are on prescription narcotic pain medication to control your pain you may also take that medication the morning of surgery.      General Instructions:     Do not eat solid food after midnight the night before surgery.  Clear liquids day of surgery are allowed but must be stopped at least two hours before your hospital arrival time.       Allowed clear liquids      Water, sodas, and tea or coffee with no cream or milk added.       12 to 20 ounces of a clear liquid that contains carbohydrates is recommended.  If non-diabetic, have Gatorade or Powerade.  If diabetic, have G2 or Powerade Zero.     Do not have liquids red in color.  Do not consume chicken, beef, pork or vegetable broth or bouillon cubes of any variety as they are not considered clear liquids and are not allowed.      Infants may have breast milk up to four hours before surgery.  Infants drinking formula may drink formula up to six hours before surgery.   Patients who avoid smoking, chewing tobacco and alcohol for 4 weeks prior to surgery have a reduced risk of post-operative complications.  Quit smoking as many days before surgery as you can.  Do not smoke, use chewing tobacco or drink alcohol the day of surgery.   If applicable bring your C-PAP/ BI-PAP machine in with you to preop day of surgery.  Bring any papers given to you in the doctor’s office.  Wear clean comfortable clothes.  Do not wear contact lenses, false eyelashes or make-up.  Bring a case for your glasses.   Bring crutches or walker if applicable.  Remove all piercings.  Leave jewelry and any other valuables at home.  Hair extensions with metal clips must be removed prior to surgery.  The Pre-Admission Testing nurse will instruct you to bring medications if unable to obtain an accurate list in Pre-Admission Testing.          Preventing a Surgical Site Infection:  For 2 to 3 days before surgery, avoid shaving with a  razor because the razor can irritate skin and make it easier to develop an infection.    Any areas of open skin can increase the risk of a post-operative wound infection by allowing bacteria to enter and travel throughout the body.  Notify your surgeon if you have any skin wounds / rashes even if it is not near the expected surgical site.  The area will need assessed to determine if surgery should be delayed until it is healed.  The night prior to surgery shower using a fresh bar of anti-bacterial soap (such as Dial) and clean washcloth.  Sleep in a clean bed with clean clothing.  Do not allow pets to sleep with you.  Shower on the morning of surgery using a fresh bar of anti-bacterial soap (such as Dial) and clean washcloth.  Dry with a clean towel and dress in clean clothing.  Ask your surgeon if you will be receiving antibiotics prior to surgery.  Make sure you, your family, and all healthcare providers clean their hands with soap and water or an alcohol based hand  before caring for you or your wound.    Day of surgery:  Your arrival time is approximately two hours before your scheduled surgery time.  Please note if you have an early arrival time the surgery doors do not open before 5:00 AM.  Upon arrival, a Pre-op nurse and Anesthesiologist will review your health history, obtain vital signs, and answer questions you may have.  The only belongings needed at this time will be a list of your home medications and if applicable your C-PAP/BI-PAP machine.  A Pre-op nurse will start an IV and you may receive medication in preparation for surgery, including something to help you relax.     Please be aware that surgery does come with discomfort.  We want to make every effort to control your discomfort so please discuss any uncontrolled symptoms with your nurse.   Your doctor will most likely have prescribed pain medications.      If you are going home after surgery you will receive individualized written care  instructions before being discharged.  A responsible adult must drive you to and from the hospital on the day of your surgery and ideally stay with you through the night.   .  Discharge prescriptions can be filled by the hospital pharmacy during regular pharmacy hours.  If you are having surgery late in the day/evening your prescription may be e-prescribed to your pharmacy.  Please verify your pharmacy hours or chose a 24 hour pharmacy to avoid not having access to your prescription because your pharmacy has closed for the day.    If you are staying overnight following surgery, you will be transported to your hospital room following the recovery period.  [facility] has all private rooms.    If you have any questions please call Pre-Admission Testing at [phone].  Deductibles and co-payments are collected on the day of service. Please be prepared to pay the required co-pay, deductible or deposit on the day of service as defined by your plan.    Call your surgeon immediately if you experience any of the following symptoms:  Sore Throat  Shortness of Breath or difficulty breathing  Cough  Chills  Body soreness or muscle pain  Headache  Fever  New loss of taste or smell  Do not arrive for your surgery ill.  Your procedure will need to be rescheduled to another time.  You will need to call your physician before the day of surgery to avoid any unnecessary exposure to hospital staff as well as other patients.        CHLORHEXIDINE CLOTH INSTRUCTIONS  The morning of surgery follow these instructions using the Chlorhexidine cloths you've been given.  These steps reduce bacteria on the body.  Do not use the cloths near your eyes, ears mouth, genitalia or on open wounds.  Throw the cloths away after use but do not try to flush them down a toilet.      Open and remove one cloth at a time from the package.    Leave the cloth unfolded and begin the bathing.  Massage the skin with the cloths using gentle  pressure to remove bacteria.  Do not scrub harshly.   Follow the steps below with one 2% CHG cloth per area (6 total cloths).  One cloth for neck, shoulders and chest.  One cloth for both arms, hands, fingers and underarms (do underarms last).  One cloth for the abdomen followed by groin.  One cloth for right leg and foot including between the toes.  One cloth for left leg and foot including between the toes.  The last cloth is to be used for the back of the neck, back and buttocks.    Allow the CHG to air dry 3 minutes on the skin which will give it time to work and decrease the chance of irritation.  The skin may feel sticky until it is dry.  Do not rinse with water or any other liquid or you will lose the beneficial effects of the CHG.  If mild skin irritation occurs, do rinse the skin to remove the CHG.  Report this to the nurse at time of admission.  Do not apply lotions, creams, ointments, deodorants or perfumes after using the clothes. Dress in clean clothes before coming to the hospital.

## 2025-01-15 RX ORDER — ASPIRIN 81 MG/1
81 TABLET ORAL 2 TIMES DAILY
Qty: 90 TABLET | Refills: 0 | Status: SHIPPED | OUTPATIENT
Start: 2025-01-15

## 2025-01-15 RX ORDER — ACETAMINOPHEN 500 MG
1000 TABLET ORAL EVERY 8 HOURS
Qty: 90 TABLET | Refills: 1 | Status: SHIPPED | OUTPATIENT
Start: 2025-01-15 | End: 2025-02-14

## 2025-01-15 RX ORDER — CELECOXIB 200 MG/1
200 CAPSULE ORAL DAILY
Qty: 60 CAPSULE | Refills: 0 | Status: SHIPPED | OUTPATIENT
Start: 2025-01-15

## 2025-01-15 RX ORDER — OXYCODONE HYDROCHLORIDE 5 MG/1
5 TABLET ORAL EVERY 4 HOURS PRN
Qty: 18 TABLET | Refills: 0 | Status: SHIPPED | OUTPATIENT
Start: 2025-01-15 | End: 2026-01-15

## 2025-01-15 RX ORDER — ONDANSETRON 8 MG/1
8 TABLET, ORALLY DISINTEGRATING ORAL EVERY 8 HOURS PRN
Qty: 30 TABLET | Refills: 0 | Status: SHIPPED | OUTPATIENT
Start: 2025-01-15

## 2025-01-15 NOTE — H&P
"    Orthopaedic & Sports Medicine Surgery  Dr. Jair Villafana MD  [phone]       History & Physical       Patient: Obinna White    Proposed Surgery and date: Procedure(s) (LRB):  LEFT KNEE ANTERIOR CRUCIATE LIGAMENT RECONSTRUCTION WITH QUAD ALLOGRAFT LATERAL MENISECTOMY (Left)     YOB: 1985    Medical Record Number: [id number]  Wt Readings from Last 3 Encounters:   01/10/25 133 kg (294 lb)   10/28/24 133 kg (294 lb)   10/23/24 129 kg (284 lb)     Ht Readings from Last 3 Encounters:   01/10/25 188 cm (74\")   10/28/24 182.9 cm (72.01\")   10/23/24 182.9 cm (72.01\")     There is no height or weight on file to calculate BMI.  No height and weight on file for this encounter.      Surgeon:  Dr. Jair Villafana M.D.        Chief Complaints/History of Present Illness: 39 y.o. male presents with left knee injury. Left knee acl tear and lateral meniscus tear.     Allergies: No Known Allergies    Medications:   Home Medications:  No current facility-administered medications on file prior to encounter.     Current Outpatient Medications on File Prior to Encounter   Medication Sig    chlorthalidone (HYGROTON) 25 MG tablet Take 1 tablet by mouth Daily.    metoprolol succinate XL (TOPROL-XL) 50 MG 24 hr tablet Take 1 tablet by mouth Daily.    rosuvastatin (Crestor) 10 MG tablet Take 1 tablet by mouth Daily.     Current Medications:  Scheduled Meds:  Continuous Infusions:No current facility-administered medications for this encounter.    PRN Meds:.    Past Medical History:   Diagnosis Date    Acute medial meniscus tear     Allergic 1/2011    Seasonal - ragweed    Anxiety 1/2017    Arthritis 3/2018    Attention deficit hyperactivity disorder (ADHD), predominantly inattentive type 08/11/2020    Depression 1/2018    Headache 2/2015    Hypertension     Mixed hyperlipidemia 09/19/2017    Obesity (BMI 30-39.9) 11/11/2019    PONV (postoperative nausea and vomiting)     Sleep apnea     CPAP    Torn ACL         Past Surgical " History:   Procedure Laterality Date    EYE SURGERY  Lasik 1/2009    KNEE SURGERY      VASECTOMY          Social History     Occupational History    Not on file   Tobacco Use    Smoking status: Never    Smokeless tobacco: Never   Vaping Use    Vaping status: Never Used   Substance and Sexual Activity    Alcohol use: Yes     Comment: About 2 drinks a month    Drug use: No    Sexual activity: Not Currently     Partners: Female     Birth control/protection: Vasectomy      Social History     Social History Narrative    Not on file        Family History   Problem Relation Age of Onset    Arthritis Mother     Depression Mother     Hearing loss Mother     Hypertension Father     Hyperlipidemia Father     Depression Father     Alcohol abuse Father     Arthritis Father     Drug abuse Father     Early death Father         Age 54    Mental illness Father         Schizophrenia/bipolar/AIMEE    Depression Sister     Depression Brother     Learning disabilities Brother     Alcohol abuse Brother     Diabetes Maternal Grandmother     Osteoporosis Maternal Grandmother     Arthritis Maternal Grandmother     Cancer Paternal Grandmother     Depression Paternal Grandmother     Early death Paternal Grandmother         Age 44    Depression Paternal Grandfather     Early death Paternal Grandfather         42    Alcohol abuse Paternal Grandfather     Malig Hyperthermia Neg Hx          Review of Systems: 14 point review of systems was performed and was negative except as documented in the history of present illness.      Physical Exam: 39 y.o. male    Vital signs reviewed.    General Appearance:    Alert, cooperative, in no acute distress                  General: alert, oriented  Eyes: conjunctiva clear  ENT: external ears and nose atraumatic  CV: no peripheral edema  Resp: normal respiratory effort  Skin: no rashes or wounds; normal turgor  Psych: mood and affect appropriate  Lymph: no nodes appreciated  Neuro: gross sensation  intact  Vascular:  Palpable peripheral pulse in noted extremity  Musculoskeletal Extremities:  tenderness over operative extremity. Moves all extremities well, no to minimal LE edema, no cyanosis, no redness            Diagnostic Tests:  Lab Results (last 7 days)       ** No results found for the last 168 hours. **            Radiology images/reports reviewed and as indicated previously in the note.       Assessment: Left knee recurrent ACL tear with complex lateral meniscus tear    Plan: This happened on 10/13/2024. He has a previous ACL reconstruction on this knee in 2018 [place]. That had normal postop. Looks like he ruptured the graft and has a lateral meniscus tear. We discussed diagnosis and treatment and potential options of revision versus conservative treatment with therapy. He has tried physical therapy and some pain is gone better but still continues to have instability especially with uneven ground, and some popping. This inhibiting his life enough at this point that he would like to proceed forward with revision surgery.    We discussed treatment options at length both nonoperative and operative.  Given age, desire to remain active in pivoting activities, and risks of further injury, patient is an appropriate candidate for surgical management and wants to proceed.  My operative treatment approach would be a knee arthroscopy, ACL reconstruction, possible meniscal surgery, repair versus partial meniscectomy as indicated, debridement, and any surgery as indicated. Various graft options discussed, including autograft vs allograft, and due to the patient’s age and activity level they opted to proceed forward with allograft, possible autograft if needed.  We discussed details of the procedure as well as the postoperative rehabilitation and protocol.  We discussed the risks, complications, and benefits.  Those risks include but are not limited to bleeding, infection, neurovascular injury, pain, stiffness,  weakness, failure of healing, re-rupture, hardware issues, disease transmission, blood clots, arthritis, continued pain, any need for further procedures.  After having this extended discussion, the patient wished to proceed forward with surgery. Standard preoperative lab/studies to be obtained, preoperative clearance to be obtained only if indicated. We will plan to schedule this at a mutually convenient time as an outpatient surgery.    The plan is most likely ACL reconstruction revision with allograft with partial lateral meniscectomy.  Would like the consent to read:  Left anterior cruciate ligament reconstruction revision with allograft, medial and lateral meniscectomy versus repair as needed, debridement as needed, potential hardware removal, potential bone grafting and 2 stage procedure.  Facility: [facility], and he would like to try for January  We will go ahead and get them fitted for a T scope knee brace today and have them bring it to surgery. This is to help stabilize the knee.  He will need physical therapy scheduled to start within 1 week, ideally a few days, after the surgery. I will see them back for follow-up about 10 to 14 days after surgery. Plan on aspirin 81 mg twice daily for 6 weeks for DVT prophylaxis postop.    We are still trying to get the operative report. Based on the CT scan I am not sure if there is biocomposite inferior and screw, or potentially suspensory fixation or distal out of the imaging. Will try to get that to know for sure. He says he did well after surgery for a while until this new injury. Do not see any high-grade lysis of the tunnels, so potentially we used the tunnels or may potentially be able to do a completely separate tunnel more posterior inferior depending on the positioning, I just cannot quite tell on the CT scan. He understands that we do need bone grafting including bone dowels available and depending on the tunnels there is the possibility of just  doing bone grafting with meniscectomy, and delayed ACL reconstruction. So plan on likely doing arthroscopy at first to see the tunnel position and confirmed that it does not need staging, then we can open up repair of the graft. For the graft would like 10 millimeters in diameter and at least 80 mm in length and do a full tunnel on the tibia. Extended button on the femur.    We will plan on proceeding with surgery at patient's request. Dr. Villafana has reviewed details of procedure with patient today and discussed risks, benefits, alternatives, and limitations of the procedure in laymen's terms with the risks including but not limited to:  neurovascular damage, bleeding, infection, chronic pain, worsening of pain, chondrolysis, recurrent problem,  swelling, loss of motion, weakness, stiffness, instability, DVT, pulmonary embolus, death, stroke, complex regional pain syndrome, and need for additional procedures.  No guarantees were given regarding results of surgery.     Patient was given the opportunity to ask and have all questions answered today.  The patient voiced understanding of the risks, benefits, and alternative forms of treatment that were discussed and the patient consents to proceed with surgery.     Discharge Plan: Home with f/u in with Dr. Villafana  Date: 1/15/2025    BUNNY Roman  [facility]  Orthopaedic Surgery, Sports Medicine  [phone]

## 2025-01-17 ENCOUNTER — HOSPITAL ENCOUNTER (OUTPATIENT)
Facility: HOSPITAL | Age: 40
Setting detail: HOSPITAL OUTPATIENT SURGERY
Discharge: HOME | End: 2025-01-17
Attending: STUDENT IN AN ORGANIZED HEALTH CARE EDUCATION/TRAINING PROGRAM | Admitting: STUDENT IN AN ORGANIZED HEALTH CARE EDUCATION/TRAINING PROGRAM
Payer: COMMERCIAL

## 2025-01-17 ENCOUNTER — ANESTHESIA (OUTPATIENT)
Dept: PERIOP | Facility: HOSPITAL | Age: 40
End: 2025-01-17
Payer: COMMERCIAL

## 2025-01-17 ENCOUNTER — APPOINTMENT (OUTPATIENT)
Dept: GENERAL RADIOLOGY | Facility: HOSPITAL | Age: 40
End: 2025-01-17
Payer: COMMERCIAL

## 2025-01-17 ENCOUNTER — ANESTHESIA EVENT (OUTPATIENT)
Dept: PERIOP | Facility: HOSPITAL | Age: 40
End: 2025-01-17
Payer: COMMERCIAL

## 2025-01-17 VITALS
DIASTOLIC BLOOD PRESSURE: 74 MMHG | RESPIRATION RATE: 18 BRPM | SYSTOLIC BLOOD PRESSURE: 120 MMHG | TEMPERATURE: 98 F | OXYGEN SATURATION: 95 % | HEART RATE: 94 BPM

## 2025-01-17 DIAGNOSIS — Z98.890 S/P ACL RECONSTRUCTION: Primary | ICD-10-CM

## 2025-01-17 PROCEDURE — 25010000002 EPINEPHRINE PER 0.1 MG: Performed by: STUDENT IN AN ORGANIZED HEALTH CARE EDUCATION/TRAINING PROGRAM

## 2025-01-17 PROCEDURE — 25010000002 VANCOMYCIN 1 G RECONSTITUTED SOLUTION 1 EACH VIAL: Performed by: STUDENT IN AN ORGANIZED HEALTH CARE EDUCATION/TRAINING PROGRAM

## 2025-01-17 PROCEDURE — 25810000003 LACTATED RINGERS PER 1000 ML: Performed by: STUDENT IN AN ORGANIZED HEALTH CARE EDUCATION/TRAINING PROGRAM

## 2025-01-17 PROCEDURE — 25010000002 FENTANYL CITRATE (PF) 50 MCG/ML SOLUTION: Performed by: NURSE ANESTHETIST, CERTIFIED REGISTERED

## 2025-01-17 PROCEDURE — 25010000002 MIDAZOLAM PER 1 MG: Performed by: STUDENT IN AN ORGANIZED HEALTH CARE EDUCATION/TRAINING PROGRAM

## 2025-01-17 PROCEDURE — 25010000002 MAGNESIUM SULFATE PER 500 MG OF MAGNESIUM: Performed by: NURSE ANESTHETIST, CERTIFIED REGISTERED

## 2025-01-17 PROCEDURE — 25010000002 PROPOFOL 10 MG/ML EMULSION: Performed by: NURSE ANESTHETIST, CERTIFIED REGISTERED

## 2025-01-17 PROCEDURE — 25010000002 CEFAZOLIN PER 500 MG: Performed by: NURSE PRACTITIONER

## 2025-01-17 PROCEDURE — 25010000002 PROPOFOL 200 MG/20ML EMULSION: Performed by: NURSE ANESTHETIST, CERTIFIED REGISTERED

## 2025-01-17 PROCEDURE — 25010000002 HYDROMORPHONE 1 MG/ML SOLUTION: Performed by: NURSE ANESTHETIST, CERTIFIED REGISTERED

## 2025-01-17 PROCEDURE — C1713 ANCHOR/SCREW BN/BN,TIS/BN: HCPCS | Performed by: STUDENT IN AN ORGANIZED HEALTH CARE EDUCATION/TRAINING PROGRAM

## 2025-01-17 PROCEDURE — 25010000002 GLYCOPYRROLATE 1 MG/5ML SOLUTION: Performed by: NURSE ANESTHETIST, CERTIFIED REGISTERED

## 2025-01-17 PROCEDURE — 25010000002 ROPIVACAINE PER 1 MG: Performed by: STUDENT IN AN ORGANIZED HEALTH CARE EDUCATION/TRAINING PROGRAM

## 2025-01-17 PROCEDURE — 25010000002 ONDANSETRON PER 1 MG: Performed by: NURSE ANESTHETIST, CERTIFIED REGISTERED

## 2025-01-17 PROCEDURE — 25010000002 SUGAMMADEX 200 MG/2ML SOLUTION: Performed by: NURSE ANESTHETIST, CERTIFIED REGISTERED

## 2025-01-17 PROCEDURE — L1833 KO ADJ JNT POS R SUP PRE OTS: HCPCS | Performed by: STUDENT IN AN ORGANIZED HEALTH CARE EDUCATION/TRAINING PROGRAM

## 2025-01-17 PROCEDURE — 25010000002 DEXAMETHASONE PER 1 MG: Performed by: STUDENT IN AN ORGANIZED HEALTH CARE EDUCATION/TRAINING PROGRAM

## 2025-01-17 PROCEDURE — 25010000002 LIDOCAINE PF 2% 2 % SOLUTION: Performed by: NURSE ANESTHETIST, CERTIFIED REGISTERED

## 2025-01-17 PROCEDURE — 25010000002 DEXAMETHASONE SODIUM PHOSPHATE 20 MG/5ML SOLUTION: Performed by: NURSE ANESTHETIST, CERTIFIED REGISTERED

## 2025-01-17 PROCEDURE — 25010000002 CEFAZOLIN PER 500 MG: Performed by: NURSE ANESTHETIST, CERTIFIED REGISTERED

## 2025-01-17 PROCEDURE — 76000 FLUOROSCOPY <1 HR PHYS/QHP: CPT

## 2025-01-17 PROCEDURE — 25010000002 FENTANYL CITRATE (PF) 50 MCG/ML SOLUTION: Performed by: STUDENT IN AN ORGANIZED HEALTH CARE EDUCATION/TRAINING PROGRAM

## 2025-01-17 DEVICE — IMPL SYS, ALLOGRAFT GRAFTLINK CP 2
Type: IMPLANTABLE DEVICE | Site: KNEE | Status: FUNCTIONAL
Brand: ARTHREX®

## 2025-01-17 DEVICE — TIGHTROPE ® II BTB WITH DEPLOYING SUTURE
Type: IMPLANTABLE DEVICE | Site: KNEE | Status: FUNCTIONAL
Brand: ARTHREX®

## 2025-01-17 DEVICE — GRFT TS ACL FLEXIGRFT 10.5X90MM: Type: IMPLANTABLE DEVICE | Site: KNEE | Status: FUNCTIONAL

## 2025-01-17 DEVICE — IMPLSYS 2NDRY FIXATN PEEKSWVLK 4.75X19.1
Type: IMPLANTABLE DEVICE | Site: KNEE | Status: FUNCTIONAL
Brand: ARTHREX®

## 2025-01-17 DEVICE — TIGHTROPE BUTTON EXTENDER
Type: IMPLANTABLE DEVICE | Site: KNEE | Status: FUNCTIONAL
Brand: ARTHREX®

## 2025-01-17 RX ORDER — ONDANSETRON 2 MG/ML
INJECTION INTRAMUSCULAR; INTRAVENOUS AS NEEDED
Status: DISCONTINUED | OUTPATIENT
Start: 2025-01-17 | End: 2025-01-17 | Stop reason: SURG

## 2025-01-17 RX ORDER — TRANEXAMIC ACID 10 MG/ML
1000 INJECTION, SOLUTION INTRAVENOUS ONCE
Status: DISCONTINUED | OUTPATIENT
Start: 2025-01-17 | End: 2025-01-17 | Stop reason: HOSPADM

## 2025-01-17 RX ORDER — SODIUM CHLORIDE 0.9 % (FLUSH) 0.9 %
3-10 SYRINGE (ML) INJECTION AS NEEDED
Status: DISCONTINUED | OUTPATIENT
Start: 2025-01-17 | End: 2025-01-17 | Stop reason: HOSPADM

## 2025-01-17 RX ORDER — ONDANSETRON 2 MG/ML
4 INJECTION INTRAMUSCULAR; INTRAVENOUS ONCE AS NEEDED
Status: COMPLETED | OUTPATIENT
Start: 2025-01-17 | End: 2025-01-17

## 2025-01-17 RX ORDER — FENTANYL CITRATE 50 UG/ML
50 INJECTION, SOLUTION INTRAMUSCULAR; INTRAVENOUS
Status: DISCONTINUED | OUTPATIENT
Start: 2025-01-17 | End: 2025-01-17 | Stop reason: HOSPADM

## 2025-01-17 RX ORDER — FENTANYL CITRATE 50 UG/ML
50 INJECTION, SOLUTION INTRAMUSCULAR; INTRAVENOUS ONCE AS NEEDED
Status: COMPLETED | OUTPATIENT
Start: 2025-01-17 | End: 2025-01-17

## 2025-01-17 RX ORDER — HYDROMORPHONE HYDROCHLORIDE 1 MG/ML
0.5 INJECTION, SOLUTION INTRAMUSCULAR; INTRAVENOUS; SUBCUTANEOUS
Status: DISCONTINUED | OUTPATIENT
Start: 2025-01-17 | End: 2025-01-17 | Stop reason: HOSPADM

## 2025-01-17 RX ORDER — HYDRALAZINE HYDROCHLORIDE 20 MG/ML
5 INJECTION INTRAMUSCULAR; INTRAVENOUS
Status: DISCONTINUED | OUTPATIENT
Start: 2025-01-17 | End: 2025-01-17 | Stop reason: HOSPADM

## 2025-01-17 RX ORDER — LIDOCAINE HYDROCHLORIDE 20 MG/ML
INJECTION, SOLUTION EPIDURAL; INFILTRATION; INTRACAUDAL; PERINEURAL AS NEEDED
Status: DISCONTINUED | OUTPATIENT
Start: 2025-01-17 | End: 2025-01-17 | Stop reason: SURG

## 2025-01-17 RX ORDER — MAGNESIUM SULFATE HEPTAHYDRATE 500 MG/ML
INJECTION, SOLUTION INTRAMUSCULAR; INTRAVENOUS AS NEEDED
Status: DISCONTINUED | OUTPATIENT
Start: 2025-01-17 | End: 2025-01-17 | Stop reason: SURG

## 2025-01-17 RX ORDER — PROCHLORPERAZINE EDISYLATE 5 MG/ML
10 INJECTION INTRAMUSCULAR; INTRAVENOUS EVERY 6 HOURS PRN
Status: DISCONTINUED | OUTPATIENT
Start: 2025-01-17 | End: 2025-01-17 | Stop reason: HOSPADM

## 2025-01-17 RX ORDER — ACETAMINOPHEN 500 MG
1000 TABLET ORAL ONCE
Status: DISCONTINUED | OUTPATIENT
Start: 2025-01-17 | End: 2025-01-17 | Stop reason: HOSPADM

## 2025-01-17 RX ORDER — PROMETHAZINE HYDROCHLORIDE 25 MG/1
25 SUPPOSITORY RECTAL ONCE AS NEEDED
Status: DISCONTINUED | OUTPATIENT
Start: 2025-01-17 | End: 2025-01-17 | Stop reason: HOSPADM

## 2025-01-17 RX ORDER — FLUMAZENIL 0.1 MG/ML
0.2 INJECTION INTRAVENOUS AS NEEDED
Status: DISCONTINUED | OUTPATIENT
Start: 2025-01-17 | End: 2025-01-17 | Stop reason: HOSPADM

## 2025-01-17 RX ORDER — SCOLOPAMINE TRANSDERMAL SYSTEM 1 MG/1
1 PATCH, EXTENDED RELEASE TRANSDERMAL ONCE
Status: DISCONTINUED | OUTPATIENT
Start: 2025-01-17 | End: 2025-01-17 | Stop reason: HOSPADM

## 2025-01-17 RX ORDER — FENTANYL CITRATE 50 UG/ML
INJECTION, SOLUTION INTRAMUSCULAR; INTRAVENOUS AS NEEDED
Status: DISCONTINUED | OUTPATIENT
Start: 2025-01-17 | End: 2025-01-17 | Stop reason: SURG

## 2025-01-17 RX ORDER — SUCCINYLCHOLINE/SOD CL,ISO/PF 200MG/10ML
SYRINGE (ML) INTRAVENOUS AS NEEDED
Status: DISCONTINUED | OUTPATIENT
Start: 2025-01-17 | End: 2025-01-17 | Stop reason: SURG

## 2025-01-17 RX ORDER — SODIUM CHLORIDE, SODIUM LACTATE, POTASSIUM CHLORIDE, CALCIUM CHLORIDE 600; 310; 30; 20 MG/100ML; MG/100ML; MG/100ML; MG/100ML
9 INJECTION, SOLUTION INTRAVENOUS CONTINUOUS
Status: DISCONTINUED | OUTPATIENT
Start: 2025-01-17 | End: 2025-01-17 | Stop reason: HOSPADM

## 2025-01-17 RX ORDER — LABETALOL HYDROCHLORIDE 5 MG/ML
5 INJECTION, SOLUTION INTRAVENOUS
Status: DISCONTINUED | OUTPATIENT
Start: 2025-01-17 | End: 2025-01-17 | Stop reason: HOSPADM

## 2025-01-17 RX ORDER — OXYCODONE AND ACETAMINOPHEN 7.5; 325 MG/1; MG/1
1 TABLET ORAL EVERY 4 HOURS PRN
Status: DISCONTINUED | OUTPATIENT
Start: 2025-01-17 | End: 2025-01-17 | Stop reason: HOSPADM

## 2025-01-17 RX ORDER — IPRATROPIUM BROMIDE AND ALBUTEROL SULFATE 2.5; .5 MG/3ML; MG/3ML
3 SOLUTION RESPIRATORY (INHALATION) ONCE AS NEEDED
Status: DISCONTINUED | OUTPATIENT
Start: 2025-01-17 | End: 2025-01-17 | Stop reason: HOSPADM

## 2025-01-17 RX ORDER — CEFAZOLIN SODIUM 500 MG/2.2ML
INJECTION, POWDER, FOR SOLUTION INTRAMUSCULAR; INTRAVENOUS AS NEEDED
Status: DISCONTINUED | OUTPATIENT
Start: 2025-01-17 | End: 2025-01-17 | Stop reason: SURG

## 2025-01-17 RX ORDER — LIDOCAINE HYDROCHLORIDE 10 MG/ML
0.5 INJECTION, SOLUTION INFILTRATION; PERINEURAL ONCE AS NEEDED
Status: DISCONTINUED | OUTPATIENT
Start: 2025-01-17 | End: 2025-01-17 | Stop reason: HOSPADM

## 2025-01-17 RX ORDER — ATROPINE SULFATE 0.4 MG/ML
0.4 INJECTION, SOLUTION INTRAMUSCULAR; INTRAVENOUS; SUBCUTANEOUS ONCE AS NEEDED
Status: DISCONTINUED | OUTPATIENT
Start: 2025-01-17 | End: 2025-01-17 | Stop reason: HOSPADM

## 2025-01-17 RX ORDER — SODIUM CHLORIDE 0.9 % (FLUSH) 0.9 %
3 SYRINGE (ML) INJECTION EVERY 12 HOURS SCHEDULED
Status: DISCONTINUED | OUTPATIENT
Start: 2025-01-17 | End: 2025-01-17 | Stop reason: HOSPADM

## 2025-01-17 RX ORDER — PROPOFOL 10 MG/ML
INJECTION, EMULSION INTRAVENOUS AS NEEDED
Status: DISCONTINUED | OUTPATIENT
Start: 2025-01-17 | End: 2025-01-17 | Stop reason: SURG

## 2025-01-17 RX ORDER — DEXAMETHASONE SODIUM PHOSPHATE 4 MG/ML
INJECTION, SOLUTION INTRA-ARTICULAR; INTRALESIONAL; INTRAMUSCULAR; INTRAVENOUS; SOFT TISSUE
Status: COMPLETED | OUTPATIENT
Start: 2025-01-17 | End: 2025-01-17

## 2025-01-17 RX ORDER — DEXAMETHASONE SODIUM PHOSPHATE 4 MG/ML
INJECTION, SOLUTION INTRA-ARTICULAR; INTRALESIONAL; INTRAMUSCULAR; INTRAVENOUS; SOFT TISSUE AS NEEDED
Status: DISCONTINUED | OUTPATIENT
Start: 2025-01-17 | End: 2025-01-17 | Stop reason: SURG

## 2025-01-17 RX ORDER — EPHEDRINE SULFATE 50 MG/ML
5 INJECTION, SOLUTION INTRAVENOUS ONCE AS NEEDED
Status: DISCONTINUED | OUTPATIENT
Start: 2025-01-17 | End: 2025-01-17 | Stop reason: HOSPADM

## 2025-01-17 RX ORDER — NALOXONE HCL 0.4 MG/ML
0.2 VIAL (ML) INJECTION AS NEEDED
Status: DISCONTINUED | OUTPATIENT
Start: 2025-01-17 | End: 2025-01-17 | Stop reason: HOSPADM

## 2025-01-17 RX ORDER — TRANEXAMIC ACID 100 MG/ML
INJECTION, SOLUTION INTRAVENOUS AS NEEDED
Status: DISCONTINUED | OUTPATIENT
Start: 2025-01-17 | End: 2025-01-17 | Stop reason: SURG

## 2025-01-17 RX ORDER — PROMETHAZINE HYDROCHLORIDE 25 MG/1
25 TABLET ORAL ONCE AS NEEDED
Status: DISCONTINUED | OUTPATIENT
Start: 2025-01-17 | End: 2025-01-17 | Stop reason: HOSPADM

## 2025-01-17 RX ORDER — ROPIVACAINE HYDROCHLORIDE 5 MG/ML
INJECTION, SOLUTION EPIDURAL; INFILTRATION; PERINEURAL
Status: COMPLETED | OUTPATIENT
Start: 2025-01-17 | End: 2025-01-17

## 2025-01-17 RX ORDER — KETAMINE HCL IN NACL, ISO-OSM 100MG/10ML
SYRINGE (ML) INJECTION AS NEEDED
Status: DISCONTINUED | OUTPATIENT
Start: 2025-01-17 | End: 2025-01-17 | Stop reason: SURG

## 2025-01-17 RX ORDER — MIDAZOLAM HYDROCHLORIDE 1 MG/ML
1 INJECTION, SOLUTION INTRAMUSCULAR; INTRAVENOUS
Status: DISCONTINUED | OUTPATIENT
Start: 2025-01-17 | End: 2025-01-17 | Stop reason: HOSPADM

## 2025-01-17 RX ORDER — GLYCOPYRROLATE 0.2 MG/ML
INJECTION INTRAMUSCULAR; INTRAVENOUS AS NEEDED
Status: DISCONTINUED | OUTPATIENT
Start: 2025-01-17 | End: 2025-01-17 | Stop reason: SURG

## 2025-01-17 RX ORDER — DIPHENHYDRAMINE HYDROCHLORIDE 50 MG/ML
12.5 INJECTION INTRAMUSCULAR; INTRAVENOUS
Status: DISCONTINUED | OUTPATIENT
Start: 2025-01-17 | End: 2025-01-17 | Stop reason: HOSPADM

## 2025-01-17 RX ORDER — ACETAMINOPHEN 500 MG
1000 TABLET ORAL ONCE
Status: COMPLETED | OUTPATIENT
Start: 2025-01-17 | End: 2025-01-17

## 2025-01-17 RX ORDER — HYDROCODONE BITARTRATE AND ACETAMINOPHEN 5; 325 MG/1; MG/1
1 TABLET ORAL ONCE AS NEEDED
Status: DISCONTINUED | OUTPATIENT
Start: 2025-01-17 | End: 2025-01-17 | Stop reason: HOSPADM

## 2025-01-17 RX ORDER — ROCURONIUM BROMIDE 10 MG/ML
INJECTION, SOLUTION INTRAVENOUS AS NEEDED
Status: DISCONTINUED | OUTPATIENT
Start: 2025-01-17 | End: 2025-01-17 | Stop reason: SURG

## 2025-01-17 RX ADMIN — ROPIVACAINE HYDROCHLORIDE 15 ML: 5 INJECTION EPIDURAL; INFILTRATION; PERINEURAL at 06:42

## 2025-01-17 RX ADMIN — ACETAMINOPHEN 1000 MG: 500 TABLET, FILM COATED ORAL at 06:27

## 2025-01-17 RX ADMIN — SODIUM CHLORIDE 2 G: 900 INJECTION INTRAVENOUS at 06:55

## 2025-01-17 RX ADMIN — SODIUM CHLORIDE, SODIUM LACTATE, POTASSIUM CHLORIDE, CALCIUM CHLORIDE: 20; 30; 600; 310 INJECTION, SOLUTION INTRAVENOUS at 09:18

## 2025-01-17 RX ADMIN — CEFAZOLIN 1 G: 225 INJECTION, POWDER, FOR SOLUTION INTRAMUSCULAR; INTRAVENOUS at 07:21

## 2025-01-17 RX ADMIN — ONDANSETRON 4 MG: 2 INJECTION INTRAMUSCULAR; INTRAVENOUS at 09:22

## 2025-01-17 RX ADMIN — FENTANYL CITRATE 25 MCG: 50 INJECTION, SOLUTION INTRAMUSCULAR; INTRAVENOUS at 08:18

## 2025-01-17 RX ADMIN — TRANEXAMIC ACID 1000 MG: 100 INJECTION, SOLUTION INTRAVENOUS at 07:35

## 2025-01-17 RX ADMIN — LIDOCAINE HYDROCHLORIDE 100 MG: 20 INJECTION, SOLUTION EPIDURAL; INFILTRATION; INTRACAUDAL at 07:02

## 2025-01-17 RX ADMIN — GLYCOPYRROLATE 0.2 MG: 0.2 INJECTION INTRAMUSCULAR; INTRAVENOUS at 07:17

## 2025-01-17 RX ADMIN — Medication 10 MG: at 08:39

## 2025-01-17 RX ADMIN — ROCURONIUM BROMIDE 10 MG: 10 INJECTION, SOLUTION INTRAVENOUS at 08:53

## 2025-01-17 RX ADMIN — MAGNESIUM SULFATE HEPTAHYDRATE 2 G: 500 INJECTION, SOLUTION INTRAMUSCULAR; INTRAVENOUS at 07:44

## 2025-01-17 RX ADMIN — SODIUM CHLORIDE, SODIUM LACTATE, POTASSIUM CHLORIDE, CALCIUM CHLORIDE 9 ML/HR: 20; 30; 600; 310 INJECTION, SOLUTION INTRAVENOUS at 06:26

## 2025-01-17 RX ADMIN — FENTANYL CITRATE 25 MCG: 50 INJECTION, SOLUTION INTRAMUSCULAR; INTRAVENOUS at 08:25

## 2025-01-17 RX ADMIN — SUGAMMADEX 200 MG: 100 INJECTION, SOLUTION INTRAVENOUS at 09:43

## 2025-01-17 RX ADMIN — FENTANYL CITRATE 50 MCG: 50 INJECTION, SOLUTION INTRAMUSCULAR; INTRAVENOUS at 09:08

## 2025-01-17 RX ADMIN — MIDAZOLAM 2 MG: 1 INJECTION INTRAMUSCULAR; INTRAVENOUS at 06:39

## 2025-01-17 RX ADMIN — PROPOFOL 50 MG: 10 INJECTION, EMULSION INTRAVENOUS at 07:07

## 2025-01-17 RX ADMIN — SCOPOLAMINE 1 PATCH: 1.5 PATCH, EXTENDED RELEASE TRANSDERMAL at 06:28

## 2025-01-17 RX ADMIN — ONDANSETRON 4 MG: 2 INJECTION, SOLUTION INTRAMUSCULAR; INTRAVENOUS at 10:36

## 2025-01-17 RX ADMIN — Medication 30 MG: at 07:12

## 2025-01-17 RX ADMIN — FENTANYL CITRATE 50 MCG: 50 INJECTION, SOLUTION INTRAMUSCULAR; INTRAVENOUS at 06:39

## 2025-01-17 RX ADMIN — DEXAMETHASONE SODIUM PHOSPHATE 4 MG: 4 INJECTION, SOLUTION INTRA-ARTICULAR; INTRALESIONAL; INTRAMUSCULAR; INTRAVENOUS; SOFT TISSUE at 06:42

## 2025-01-17 RX ADMIN — Medication 10 MG: at 08:07

## 2025-01-17 RX ADMIN — ROCURONIUM BROMIDE 50 MG: 10 INJECTION, SOLUTION INTRAVENOUS at 07:07

## 2025-01-17 RX ADMIN — FENTANYL CITRATE 50 MCG: 50 INJECTION, SOLUTION INTRAMUSCULAR; INTRAVENOUS at 07:38

## 2025-01-17 RX ADMIN — HYDROMORPHONE HYDROCHLORIDE 0.5 MG: 1 INJECTION, SOLUTION INTRAMUSCULAR; INTRAVENOUS; SUBCUTANEOUS at 09:33

## 2025-01-17 RX ADMIN — DEXAMETHASONE SODIUM PHOSPHATE 8 MG: 4 INJECTION, SOLUTION INTRAMUSCULAR; INTRAVENOUS at 07:17

## 2025-01-17 RX ADMIN — ROCURONIUM BROMIDE 20 MG: 10 INJECTION, SOLUTION INTRAVENOUS at 08:10

## 2025-01-17 RX ADMIN — Medication 180 MG: at 07:02

## 2025-01-17 RX ADMIN — PROPOFOL 25 MCG/KG/MIN: 10 INJECTION, EMULSION INTRAVENOUS at 07:14

## 2025-01-17 RX ADMIN — PROPOFOL 250 MG: 10 INJECTION, EMULSION INTRAVENOUS at 07:02

## 2025-01-17 NOTE — ANESTHESIA PROCEDURE NOTES
Peripheral Block    Pre-sedation assessment completed: 1/17/2025 6:39 AM    Patient reassessed immediately prior to procedure    Patient location during procedure: holding area  Start time: 1/17/2025 6:40 AM  Stop time: 1/17/2025 6:42 AM  Reason for block: at surgeon's request and post-op pain management  Performed by  Anesthesiologist: Wes Gutierrez MD  Preanesthetic Checklist  Completed: patient identified, IV checked, site marked, risks and benefits discussed, surgical consent, monitors and equipment checked, pre-op evaluation and timeout performed  Prep:  Pt Position: supine  Sterile barriers:cap, washed/disinfected hands, gloves, mask and alcohol skin prep  Prep: ChloraPrep  Patient monitoring: blood pressure monitoring, continuous pulse oximetry and EKG  Procedure    Sedation: yes  Performed under: local infiltration  Guidance:ultrasound guided    ULTRASOUND INTERPRETATION.  Using ultrasound guidance a 21 G gauge needle was placed in close proximity to the nerve, at which point, under ultrasound guidance anesthetic was injected in the area of the nerve and spread of the anesthesia was seen on ultrasound in close proximity thereto.  There were no abnormalities seen on ultrasound; a digital image was taken; and the patient tolerated the procedure with no complications. Images:still images obtained, printed/placed on chart    Laterality:left  Block Type:adductor canal block  Injection Technique:single-shot  Needle Type:echogenic and Tuohy  Needle Gauge:21 G  Resistance on Injection: none    Medications Used: dexamethasone (DECADRON) injection - Injection   4 mg - 1/17/2025 6:42:00 AM  ropivacaine (NAROPIN) 0.5 % injection - Injection   15 mL - 1/17/2025 6:42:00 AM      Post Assessment  Injection Assessment: negative aspiration for heme, no paresthesia on injection and incremental injection  Patient Tolerance:comfortable throughout block  Complications:no  Additional Notes  Ultrasound guidance used to  visualize nerve anatomy, guide needle placement and verify local anesthetic disbursement.       Performed by: Wes Gutierrez MD

## 2025-01-17 NOTE — ANESTHESIA PREPROCEDURE EVALUATION
Anesthesia Evaluation     Patient summary reviewed and Nursing notes reviewed   history of anesthetic complications:  PONV  NPO Solid Status: > 8 hours  NPO Liquid Status: > 2 hours           Airway   Mallampati: II  TM distance: >3 FB  Neck ROM: full  Dental      Pulmonary    (+) ,sleep apnea  Cardiovascular     ECG reviewed    (+) hypertension, hyperlipidemia      Neuro/Psych  GI/Hepatic/Renal/Endo    (+) obesity    Musculoskeletal     Abdominal    Substance History      OB/GYN          Other                          Anesthesia Plan    ASA 2     general     intravenous induction     Anesthetic plan, risks, benefits, and alternatives have been provided, discussed and informed consent has been obtained with: patient.        CODE STATUS:

## 2025-01-17 NOTE — ANESTHESIA POSTPROCEDURE EVALUATION
Patient: Obinna White    Procedure Summary       Date: 01/17/25 Room / Location: [facility] OR [address] [place] [address] [facility]    Anesthesia Start: 0652 Anesthesia Stop: 1002    Procedure: Left Anterior Cruciate Ligament Reconstruction revision with allograft, medial and lateral meniscectomy, debridement and patella chondroplasty (Left: Knee) Diagnosis:     Surgeons: Jair Villafana MD Provider: Wes Gutierrez MD    Anesthesia Type: general ASA Status: 2            Anesthesia Type: general    Vitals  Vitals Value Taken Time   /71 01/17/25 1045   Temp 36.7 °C (98 °F) 01/17/25 1045   Pulse 97 01/17/25 1054   Resp 20 01/17/25 1045   SpO2 93 % 01/17/25 1054   Vitals shown include unfiled device data.        Post Anesthesia Care and Evaluation    Patient location during evaluation: bedside  Patient participation: complete - patient participated  Level of consciousness: awake and alert  Pain management: adequate    Airway patency: patent  Anesthetic complications: No anesthetic complications  PONV Status: controlled  Cardiovascular status: blood pressure returned to baseline and acceptable  Respiratory status: acceptable  Hydration status: acceptable

## 2025-01-17 NOTE — ANESTHESIA PROCEDURE NOTES
Airway  Urgency: elective    Date/Time: 1/17/2025 7:04 AM  Airway not difficult    General Information and Staff    Patient location during procedure: OR  Anesthesiologist: Wes Gutierrez MD  CRNA/CAA: Zena Weaver CRNA    Indications and Patient Condition  Indications for airway management: airway protection    Preoxygenated: yes  Mask difficulty assessment: 3 - difficult mask (inadequate, unstable or two providers) +/- NMBA    Final Airway Details  Final airway type: endotracheal airway      Successful airway: ETT  Cuffed: yes   Successful intubation technique: video laryngoscopy  Facilitating devices/methods: intubating stylet  Endotracheal tube insertion site: oral  Blade: CMAC  Blade size: D  ETT size (mm): 7.5  Placement verified by: chest auscultation and capnometry   Measured from: lips  ETT/EBT  to lips (cm): 23  Number of attempts at approach: 2  Assessment: lips, teeth, and gum same as pre-op and atraumatic intubation    Additional Comments  Grade III view with MAC 4 blade, Grade I view with CMAC.

## 2025-01-17 NOTE — OP NOTE
Orthopaedic & Sports Medicine Surgery  Dr. Jair Villafana MD  [phone]    Operative Note    PATIENT NAME: Obinna White  MRN: [id number]  : 1985 AGE: 39 y.o. GENDER: male  DATE OF OPERATION: 2025  PREOPERATIVE DIAGNOSIS: Left ACL reconstruction complete read tear with lateral meniscus tear  POSTOPERATIVE DIAGNOSIS: Left ACL reconstruction complete read tear with lateral meniscus tear with patella chondroplasty  OPERATION PERFORMED: Left ACL reconstruction with Allograft, partial lateral meniscectomy, patellar chondroplasty  SURGEON: Jair Villafana MD  Circulator: Rashad Coles RN  Scrub Person: Jaden Cazares  Vendor Representative: Nehemias Carcamo; Fantasma Smith; Arik Valderrama  Assistant: Andriy Dawson IV CSFA  ANESTHESIA: General with Block  ASSISTANT: Andriy Dawson CFA  An assistant was necessary and used to help with instrumentation, leg positioning intraoperatively, positioning, closing, retracting, placing dressing. The case would have been extremely difficult without their skilled assistance.  ANTIBIOTICS: Ancef  ESTIMATED BLOOD LOSS: minimal  SPONGE AND NEEDLE COUNT: Correct  COMPONENTS:   Arthrex Fibertag tight rope button x 1 on the femoral side with an extended button  Arthrex tight rope ABS button x 1 on the tibial side  Graft Length: 80 mm  Femoral tunnel diameter: 9.5 mm  Tibial tunnel diameter 9.5 mm  Internal brace backup tibial fixation: Arthrex  4.75 mm PEEK  SwiveLock    INDICATIONS: Patient had an injury with an ACL tear and above-mentioned diagnosis.  He previously had ACL reconstruction in [place] and did well for a while until reinjuring this.  He continued to have mechanical symptoms and instability.  Due to their age and activity level as well as instability findings they elected to have operative intervention. The risks of surgery were discussed and included the risk of anesthesia, infection, damage to neurovascular structures, implant loosening/failure, hardware  prominence, graft rupture, tear/injury to contralateral knee,  continued instability, nonunion/malunion, the need for further procedures, medical complications, stiffness, arthritis, continued pain, damage to nerve and blood vessels, DVT, PE, MI, stroke, death, and others. No guarantees were made. The patient wished to proceed with surgery and a surgical consent was signed.  PERTINENT FINDINGS:   Grade 2B Lachman and low-grade pivot shift.  Stable to varus and valgus stress at 0 and 30deg of knee flexion.    Range of motion 0-120  degrees.   Medial and lateral gutters: No loose bodies  ACL: Complete ACL re- rupture  PCL: Intact, probed and stable  Articular cartilage: grade 2 to focal grade 3 changes on the central aspect of the patella  Medial Meniscus: Intact and stable with no tears in anterior horn, body, posterior horn and roots  Lateral Meniscus:  Complex tear of posterior horn and body. Approximately 50% was resected at the largest width.     DETAILS OF PROCEDURE:  The patient was met in the preoperative area. The site was marked. The consent and H&P were reviewed.they had a preoperative block placed by anesthesia. The patient was then wheeled back to the operative suite underwent anesthesia.  The patient was moved to the table, placed in the supine position with all bony prominences well-padded, a tourniquet was placed on the upper thigh, a leg beauchamp placed, the legs of the table dropped, and a padded leg beauchamp placed on the non-operative leg.The leg was then prepped in the normal sterile fashion, which included ChloraPrep and multiple layers of sterile drapes. A surgical timeout was performed in which administration of preoperative antibiotics and the surgical site were confirmed. The surgical incisions were marked. The planned incision sites were injected with an analgesic cocktail.       DIAGNOSTIC ARTHROSCOPY  A lateral portal was first established and the trocar was advanced in the to the  suprapatellar pouch.  The camera was then inserted and the fluid was turned on.  A thorough diagnostic arthroscopy was then performed which included the suprapatellar pouch, the trochlear groove with a dynamic patellar tracking exam, the medial and lateral gutters, the notch and ACL, and the medial and lateral compartments.  A medial portal was established using needle localization and direct visualization with the camera. A probed was used to assess the menisci, roots, ACL, and PCL.     We did insert a shaver  And perform a chondroplasty on the undersurface of the patella of the unstable edges of cartilage.  At the end this was probed and stable.  Reconstruction    MENISCUS:  Medial meniscus was intact and probed and stable.  Signs of previous partial meniscectomy.  Lateral meniscus had a complex tear of the posterior body and the posterior horn.  Shaver was used to resect the unstable flaps and contoured to a smooth edge.  At the maximum width approximately 50% was resected.    ACL:  The ACL graft remnant was removed with a combination of biter, shaver, and cautery.  The previous reconstruction was completely torn.  We remove the remnant and removed the sutures.  The femoral tunnel appear to be in adequate position and not hollowed out so we could reuse this.  We used the wand to clear off soft tissue remnants on the femur and then performed a light notchplasty.  We then used a flip cutter to drill a tunnel in the previous tunnel 9.5 mm.  We did shave removal bony debris.  We then passed our shuttling sutures through.  We then put a pin in the ACL footprint on the tibia and used acorn reamer size 9.5 mm to do a full tunnel.  We removed all bony debris.  We then shuttled the graft into the femur and flipped the button on the femur.  When tensioning this down on the tibia side the tightening mechanism on the femur broke and we cannot get fixation the graft was loose.  We took the graft out of the knee and shuttled 2  new tight rope mechanisms in place and confirmed they did tensioned down.  We then we shuttled the graft up into the knee.  We placed an extended button on the femoral side and then tension on the femur side with at least 15 mm in the tunnel.  We confirmed with fluoroscopy that the button was flipped on the cortex.  The graft was approximately 10 mm inside the tibia tunnel and looking in the intra-articular space there is a least 15 mm down in the tunnel.  We then placed an ABS button over the button on the tibia side as we do not have another tibia side tight rope.  We tensioned this down over the ABS button.  We then retention the femoral side.  He had a grade 1A Lachman.  We took the arthroscope back in the knee and the ACL was in great position with at least 15 mm duct and both tunnels in appropriate tension.  Patient had knee range of motion 0 to 120 degrees and did not impinge in the notch.  We then copiously irrigated out the knee with arthroscopic saline fluid and decompress the knee and removed all bony debris.  We then fully extended the knee and then used our tibia fixation kit for backup fixation with a swivel lock due to having an allograft and a revision.  We secured the white tensioning sutures on the tibia side and the SwiveLock and had great fixation and bite.  We then tied the ends of the sutures to the sutures out of the swivel lock.  We then deflated the tourniquet and achieved hemostasis.  Wounds were copiously irrigated.  We then closed the wounds in layers placed a sterile dressing and a telescope knee brace.  All counts were correct in the procedure.  He was woken from anesthesia and taken back to the covering room suite in stable condition.          Post Operative Course:   WBAT operative extremity with crutches controlled motion brace locked in extension when ambulating and when sleeping, and unlocked 0 to 120 degrees when sedentary.  ASA 81mg BID for  45 days  Follow up in the office in 2  weeks.  Physical therapy to start ideally within the first few days after surgery but at least within the first week.      Jair Villafana MD  [facility]  Orthopaedic Surgery, Sports Medicine  [phone]

## 2025-04-08 ENCOUNTER — LAB (OUTPATIENT)
Dept: LAB | Facility: HOSPITAL | Age: 40
End: 2025-04-08
Payer: COMMERCIAL

## 2025-04-08 ENCOUNTER — OFFICE VISIT (OUTPATIENT)
Dept: SPORTS MEDICINE | Facility: CLINIC | Age: 40
End: 2025-04-08
Payer: COMMERCIAL

## 2025-04-08 VITALS
BODY MASS INDEX: 39.66 KG/M2 | OXYGEN SATURATION: 98 % | HEIGHT: 74 IN | HEART RATE: 89 BPM | WEIGHT: 309 LBS | SYSTOLIC BLOOD PRESSURE: 126 MMHG | DIASTOLIC BLOOD PRESSURE: 80 MMHG | RESPIRATION RATE: 16 BRPM

## 2025-04-08 DIAGNOSIS — R73.03 PREDIABETES: ICD-10-CM

## 2025-04-08 DIAGNOSIS — E66.9 OBESITY (BMI 30-39.9): Primary | ICD-10-CM

## 2025-04-08 DIAGNOSIS — E78.2 MIXED HYPERLIPIDEMIA: ICD-10-CM

## 2025-04-08 DIAGNOSIS — G47.33 OSA ON CPAP: Chronic | ICD-10-CM

## 2025-04-08 LAB
ALBUMIN SERPL-MCNC: 4.8 G/DL (ref 3.5–5.2)
ALBUMIN/GLOB SERPL: 1.4 G/DL
ALP SERPL-CCNC: 70 U/L (ref 39–117)
ALT SERPL W P-5'-P-CCNC: 43 U/L (ref 1–41)
ANION GAP SERPL CALCULATED.3IONS-SCNC: 10 MMOL/L (ref 5–15)
AST SERPL-CCNC: 29 U/L (ref 1–40)
BILIRUB SERPL-MCNC: 0.3 MG/DL (ref 0–1.2)
BUN SERPL-MCNC: 16 MG/DL (ref 6–20)
BUN/CREAT SERPL: 21.6 (ref 7–25)
CALCIUM SPEC-SCNC: 9.9 MG/DL (ref 8.6–10.5)
CHLORIDE SERPL-SCNC: 99 MMOL/L (ref 98–107)
CHOLEST SERPL-MCNC: 151 MG/DL (ref 0–200)
CO2 SERPL-SCNC: 30 MMOL/L (ref 22–29)
CREAT SERPL-MCNC: 0.74 MG/DL (ref 0.76–1.27)
EGFRCR SERPLBLD CKD-EPI 2021: 116.7 ML/MIN/1.73
GLOBULIN UR ELPH-MCNC: 3.4 GM/DL
GLUCOSE SERPL-MCNC: 110 MG/DL (ref 65–99)
HBA1C MFR BLD: 6.2 % (ref 4.8–5.6)
HDLC SERPL-MCNC: 36 MG/DL (ref 40–60)
LDLC SERPL CALC-MCNC: 83 MG/DL (ref 0–100)
LDLC/HDLC SERPL: 2.14 {RATIO}
POTASSIUM SERPL-SCNC: 4.2 MMOL/L (ref 3.5–5.2)
PROT SERPL-MCNC: 8.2 G/DL (ref 6–8.5)
SODIUM SERPL-SCNC: 139 MMOL/L (ref 136–145)
TRIGL SERPL-MCNC: 189 MG/DL (ref 0–150)
VLDLC SERPL-MCNC: 32 MG/DL (ref 5–40)

## 2025-04-08 PROCEDURE — 36415 COLL VENOUS BLD VENIPUNCTURE: CPT | Performed by: FAMILY MEDICINE

## 2025-04-08 PROCEDURE — 80061 LIPID PANEL: CPT | Performed by: FAMILY MEDICINE

## 2025-04-08 PROCEDURE — 99214 OFFICE O/P EST MOD 30 MIN: CPT | Performed by: FAMILY MEDICINE

## 2025-04-08 PROCEDURE — 80053 COMPREHEN METABOLIC PANEL: CPT | Performed by: FAMILY MEDICINE

## 2025-04-08 PROCEDURE — 83036 HEMOGLOBIN GLYCOSYLATED A1C: CPT | Performed by: FAMILY MEDICINE

## 2025-04-08 RX ORDER — TIRZEPATIDE 2.5 MG/.5ML
2.5 INJECTION, SOLUTION SUBCUTANEOUS WEEKLY
Qty: 0.5 ML | Refills: 4 | Status: SHIPPED | OUTPATIENT
Start: 2025-04-08

## 2025-04-08 RX ORDER — ROSUVASTATIN CALCIUM 10 MG/1
10 TABLET, COATED ORAL DAILY
Qty: 90 TABLET | Refills: 2 | Status: SHIPPED | OUTPATIENT
Start: 2025-04-08

## 2025-04-08 NOTE — PROGRESS NOTES
"Obinna is a 39 y.o. year old male presents to [facility]    Chief Complaint   Patient presents with    Follow-up     Discuss GLP-1 medication, requesting new labs , fasting today        History of Present Illness  History of Present Illness  The patient presents for a follow-up visit.    He reports satisfactory progress with his knee, attributing the improvement to ongoing physical therapy. He believes he has regained full range of motion and has been managing without a brace for approximately 1.5 months.    He expresses interest in evaluating the efficacy of his current cholesterol medication, rosuvastatin, which he has been taking for the past 6 months.    Additionally, he is considering the potential benefits of GLP-1 medication for weight management. He recalls a previous positive experience with Vyvanse, which aided in controlling binge eating, but notes that the medication is no longer available due to a shortage. He is currently not on any weight loss medications.    MEDICATIONS  Current: Rosuvastatin  Past: Vyvanse    I have reviewed the patient's medical, family, and social history in detail and updated the computerized patient record.    /80 (BP Location: Left arm, Patient Position: Sitting, Cuff Size: Large Adult)   Pulse 89   Resp 16   Ht 188 cm (74.02\")   Wt (!) 140 kg (309 lb)   SpO2 98%   BMI 39.66 kg/m²      Physical Exam    Vital signs reviewed.   General: No acute distress.  Eyes: conjunctiva clear; pupils equally round and reactive  ENT: external ears atraumatic  CV: no peripheral edema  Resp: normal respiratory effort, no use of accessory muscles  Skin: no rashes or wounds; normal turgor  Psych: mood and affect appropriate; recent and remote memory intact  Neuro: sensation to light touch intact    Physical Exam          Common labs          10/23/2024    08:02 1/10/2025    11:59   Common Labs   Glucose 102  106    BUN 19  14    Creatinine 1.00  0.94  "   Sodium 138  136    Potassium 3.5  3.3    Chloride 97  97    Calcium 9.8  9.6    Albumin 4.3  4.4    Total Bilirubin 0.6  0.5    Alkaline Phosphatase 67  70    AST (SGOT) 31  25    ALT (SGPT) 39  33    WBC 9.54  10.07    Hemoglobin 15.0  15.4    Hematocrit 44.2  44.0    Platelets 301  319    Total Cholesterol 224     Triglycerides 214     HDL Cholesterol 30     LDL Cholesterol  154     Hemoglobin A1C 6.20              Diagnoses and all orders for this visit:    1. Obesity (BMI 30-39.9) (Primary)  -     Tirzepatide-Weight Management (Zepbound) 2.5 MG/0.5ML solution; Inject 0.5 mL under the skin into the appropriate area as directed 1 (One) Time Per Week.  Dispense: 0.5 mL; Refill: 4    2. Prediabetes  -     Hemoglobin A1c  -     Tirzepatide-Weight Management (Zepbound) 2.5 MG/0.5ML solution; Inject 0.5 mL under the skin into the appropriate area as directed 1 (One) Time Per Week.  Dispense: 0.5 mL; Refill: 4    3. Mixed hyperlipidemia  -     rosuvastatin (Crestor) 10 MG tablet; Take 1 tablet by mouth Daily.  Dispense: 90 tablet; Refill: 2  -     Comprehensive Metabolic Panel  -     Lipid Panel    4. PALMA on CPAP  -     Tirzepatide-Weight Management (Zepbound) 2.5 MG/0.5ML solution; Inject 0.5 mL under the skin into the appropriate area as directed 1 (One) Time Per Week.  Dispense: 0.5 mL; Refill: 4        Assessment & Plan  1. Cholesterol management.  He has been on rosuvastatin for 6 months and wants to check if the medication is helping. A prescription for Crestor will be sent to his [facility] pharmacy. Lab orders have been placed to monitor his cholesterol levels.    2. Prediabetes.  His A1c was 6.2 last time, and fasting sugar was 106 in January and 102 in October. The potential benefits and risks of GLP-1 medications were discussed, including their modest weight loss effects and the many unknowns associated with them. A prescription for Zepbound, a once-weekly injection, will be initiated at a dose of 2.5 mg  for one month. If tolerated, the dosage can be increased to 5 mg. The injection sites should alternate between the right and left sides of the buttock and abdomen. Lab orders have been placed to recheck his A1c and fasting sugar levels.    3. Weight management.  He expressed interest in GLP-1 medications for weight loss. The potential benefits and risks were discussed, including insurance coverage and self-pay options. A prescription for Zepbound will be sent to the [facility] pharmacy. He is advised to follow up in about 3 months to assess the effectiveness and make any necessary dosage adjustments.    Follow-up  The patient will follow up in 3 months.          Patient or patient representative verbalized consent for the use of Ambient Listening during the visit with  KIERAN Heard Jr., DO for chart documentation on 04/08/2025 at 09:02 EDT.

## 2025-04-09 ENCOUNTER — TELEPHONE (OUTPATIENT)
Dept: SPORTS MEDICINE | Facility: CLINIC | Age: 40
End: 2025-04-09
Payer: COMMERCIAL

## 2025-05-09 DIAGNOSIS — E66.9 OBESITY (BMI 30-39.9): ICD-10-CM

## 2025-05-09 DIAGNOSIS — G47.33 OSA ON CPAP: Chronic | ICD-10-CM

## 2025-05-09 DIAGNOSIS — R73.03 PREDIABETES: ICD-10-CM

## 2025-05-09 RX ORDER — TIRZEPATIDE 2.5 MG/.5ML
5 INJECTION, SOLUTION SUBCUTANEOUS WEEKLY
Qty: 1 ML | Refills: 4 | Status: SHIPPED | OUTPATIENT
Start: 2025-05-09

## 2025-05-15 ENCOUNTER — TELEPHONE (OUTPATIENT)
Dept: SPORTS MEDICINE | Facility: CLINIC | Age: 40
End: 2025-05-15
Payer: COMMERCIAL

## 2025-08-17 DIAGNOSIS — I10 ESSENTIAL HYPERTENSION: ICD-10-CM

## 2025-08-18 RX ORDER — CHLORTHALIDONE 25 MG/1
25 TABLET ORAL DAILY
Qty: 90 TABLET | Refills: 3 | Status: SHIPPED | OUTPATIENT
Start: 2025-08-18

## 2025-08-18 RX ORDER — METOPROLOL SUCCINATE 50 MG/1
50 TABLET, EXTENDED RELEASE ORAL DAILY
Qty: 90 TABLET | Refills: 3 | Status: SHIPPED | OUTPATIENT
Start: 2025-08-18

## (undated) DEVICE — HYPODERMIC SAFETY NEEDLE: Brand: MONOJECT

## (undated) DEVICE — SUT PROLN 4/0 FS2 18IN 8683G

## (undated) DEVICE — BLD SHAVER TORPEDO COOLCUT 4.0MM 13CM

## (undated) DEVICE — Ø10X 20MM BC IF SCRW, VENTED
Type: IMPLANTABLE DEVICE | Site: KNEE | Status: NON-FUNCTIONAL
Brand: ARTHREX®

## (undated) DEVICE — TBG ARTHSCP PT W CONN/REDUC 8FT

## (undated) DEVICE — INTENDED FOR TISSUE SEPARATION, AND OTHER PROCEDURES THAT REQUIRE A SHARP SURGICAL BLADE TO PUNCTURE OR CUT.: Brand: BARD-PARKER ® CARBON RIB-BACK BLADES

## (undated) DEVICE — TUBING, SUCTION, 1/4" X 20', STRAIGHT: Brand: MEDLINE INDUSTRIES, INC.

## (undated) DEVICE — KT DRP MINIVIEW STRL

## (undated) DEVICE — TBG ARTHSCP PUMP W CONN/REDUC 8FT

## (undated) DEVICE — SYR LL TP 10ML STRL

## (undated) DEVICE — TRAP FLD MINIVAC MEGADYNE 100ML

## (undated) DEVICE — APPL CHLORAPREP HI/LITE 26ML ORNG

## (undated) DEVICE — DRESSING,GAUZE,XEROFORM,CURAD,1"X8",ST: Brand: CURAD

## (undated) DEVICE — KT ACL TRANSTIB WO/ SAWBLD

## (undated) DEVICE — GLV SURG PREMIERPRO ORTHO LTX PF SZ8 BRN

## (undated) DEVICE — SOL ISO/ALC 70PCT 4OZ

## (undated) DEVICE — GLV SURG PREMIERPRO ORTHO LTX PF SZ7.5 BRN

## (undated) DEVICE — UNDERCAST PADDING: Brand: DEROYAL

## (undated) DEVICE — BUR SHAVER FLUSHCUT 8FLUT 5MM 13CM

## (undated) DEVICE — BLD SHAVER BONECUTTER 5MM 13CM

## (undated) DEVICE — PATIENT RETURN ELECTRODE, SINGLE-USE, CONTACT QUALITY MONITORING, ADULT, WITH 9FT CORD, FOR PATIENTS WEIGING OVER 33LBS. (15KG): Brand: MEGADYNE

## (undated) DEVICE — PROB ABL APOLLORF MP50 ASP 50DEG

## (undated) DEVICE — ANTIBACTERIAL UNDYED BRAIDED (POLYGLACTIN 910), SYNTHETIC ABSORBABLE SUTURE: Brand: COATED VICRYL

## (undated) DEVICE — PK ACL 40

## (undated) DEVICE — ADAPT DB SPIKE 2PCT FOR AR6400 TBG

## (undated) DEVICE — SUT VIC 2/0 FS1 27IN J443H

## (undated) DEVICE — GLV SURG SIGNATURE ESSENTIAL PF LTX SZ7.5

## (undated) DEVICE — GLV SURG SENSICARE PI LF PF 7.5 GRN STRL

## (undated) DEVICE — BRACE KN P/OP TELESCP COOL TROM STD SZ

## (undated) DEVICE — PENCL SMOKE/EVAC MEGADYNE TELESCP 10FT

## (undated) DEVICE — SUT MNCRYL 3/0 CT1 36 IN Y944H